# Patient Record
Sex: FEMALE | Race: BLACK OR AFRICAN AMERICAN | Employment: FULL TIME | URBAN - METROPOLITAN AREA
[De-identification: names, ages, dates, MRNs, and addresses within clinical notes are randomized per-mention and may not be internally consistent; named-entity substitution may affect disease eponyms.]

---

## 2019-03-07 ENCOUNTER — HOSPITAL ENCOUNTER (EMERGENCY)
Age: 24
Discharge: HOME | End: 2019-03-07
Attending: EMERGENCY MEDICINE
Payer: COMMERCIAL

## 2019-03-07 VITALS
WEIGHT: 284.25 LBS | TEMPERATURE: 98.1 F | HEART RATE: 71 BPM | HEIGHT: 66 IN | RESPIRATION RATE: 18 BRPM | BODY MASS INDEX: 45.68 KG/M2 | SYSTOLIC BLOOD PRESSURE: 136 MMHG | DIASTOLIC BLOOD PRESSURE: 61 MMHG | OXYGEN SATURATION: 100 %

## 2019-03-07 DIAGNOSIS — R11.0 NAUSEA: ICD-10-CM

## 2019-03-07 DIAGNOSIS — R51.9 NONINTRACTABLE HEADACHE, UNSPECIFIED CHRONICITY PATTERN, UNSPECIFIED HEADACHE TYPE: Primary | ICD-10-CM

## 2019-03-07 LAB
BACTERIA: ABNORMAL /HPF
BILIRUBIN URINE: NEGATIVE
BLOOD, URINE: NEGATIVE
CLARITY: CLEAR
COLOR: YELLOW
EPITHELIAL CELLS, UA: ABNORMAL /HPF
GLUCOSE URINE: NEGATIVE MG/DL
HCG(URINE) PREGNANCY TEST: NEGATIVE
KETONES, URINE: NEGATIVE MG/DL
LEUKOCYTE ESTERASE, URINE: ABNORMAL
MICROSCOPIC EXAMINATION: YES
NITRITE, URINE: NEGATIVE
PH UA: 6 (ref 5–8)
PROTEIN UA: NEGATIVE MG/DL
RBC UA: ABNORMAL /HPF (ref 0–2)
SPECIFIC GRAVITY UA: 1.02 (ref 1–1.03)
URINE TYPE: ABNORMAL
UROBILINOGEN, URINE: 0.2 E.U./DL
WBC UA: ABNORMAL /HPF (ref 0–5)

## 2019-03-07 PROCEDURE — 81001 URINALYSIS AUTO W/SCOPE: CPT

## 2019-03-07 PROCEDURE — 84703 CHORIONIC GONADOTROPIN ASSAY: CPT

## 2019-03-07 PROCEDURE — 99281 EMR DPT VST MAYX REQ PHY/QHP: CPT

## 2019-03-07 PROCEDURE — 99282 EMERGENCY DEPT VISIT SF MDM: CPT

## 2019-03-07 RX ORDER — BUTALBITAL, ACETAMINOPHEN AND CAFFEINE 50; 325; 40 MG/1; MG/1; MG/1
1 TABLET ORAL EVERY 4 HOURS PRN
Qty: 20 TABLET | Refills: 1 | Status: SHIPPED | OUTPATIENT
Start: 2019-03-07 | End: 2021-12-18

## 2019-03-07 RX ORDER — ONDANSETRON 4 MG/1
4 TABLET, ORALLY DISINTEGRATING ORAL EVERY 8 HOURS PRN
Qty: 12 TABLET | Refills: 1 | Status: SHIPPED | OUTPATIENT
Start: 2019-03-07 | End: 2019-05-05

## 2019-03-07 NOTE — ED NOTES
Patient prepared for and ready to be discharged. Patient discharged at this time in no acute distress after verbalizing understanding of discharge instructions. Patient left after receiving After Visit Summary instructions.         Piero Schmitt RN  03/07/19 0523

## 2019-03-07 NOTE — ED PROVIDER NOTES
and prognosis. The plan is to discharge to home. The patient is in agreement with the plan and questions have been answered. I also discussed with the patient and/or family the reasons which may require a return visit and the importance of follow-up care.        (Please note that portions of this note may have been completed with a voice recognition program.  Efforts were made to edit the dictation but occasionally words are mis-transcribed)        FINAL IMPRESSION:  1 -- headache  2 -- nausea                  Rob Olivera MD  03/07/19 2833

## 2019-05-05 ENCOUNTER — HOSPITAL ENCOUNTER (EMERGENCY)
Age: 24
Discharge: HOME | End: 2019-05-05
Attending: EMERGENCY MEDICINE
Payer: COMMERCIAL

## 2019-05-05 VITALS
HEIGHT: 66 IN | RESPIRATION RATE: 20 BRPM | HEART RATE: 88 BPM | TEMPERATURE: 97.9 F | SYSTOLIC BLOOD PRESSURE: 128 MMHG | DIASTOLIC BLOOD PRESSURE: 58 MMHG | WEIGHT: 286.8 LBS | BODY MASS INDEX: 46.09 KG/M2 | OXYGEN SATURATION: 100 %

## 2019-05-05 DIAGNOSIS — R11.2 NON-INTRACTABLE VOMITING WITH NAUSEA, UNSPECIFIED VOMITING TYPE: Primary | ICD-10-CM

## 2019-05-05 DIAGNOSIS — Z32.02 NEGATIVE PREGNANCY TEST: ICD-10-CM

## 2019-05-05 LAB
BILIRUBIN URINE: NEGATIVE
BLOOD, URINE: NEGATIVE
CLARITY: ABNORMAL
COLOR: YELLOW
GLUCOSE URINE: NEGATIVE MG/DL
HCG(URINE) PREGNANCY TEST: NEGATIVE
KETONES, URINE: NEGATIVE MG/DL
LEUKOCYTE ESTERASE, URINE: NEGATIVE
MICROSCOPIC EXAMINATION: ABNORMAL
NITRITE, URINE: NEGATIVE
PH UA: 6 (ref 5–8)
PROTEIN UA: NEGATIVE MG/DL
SPECIFIC GRAVITY UA: 1.02 (ref 1–1.03)
URINE TYPE: ABNORMAL
UROBILINOGEN, URINE: 1 E.U./DL

## 2019-05-05 PROCEDURE — 99282 EMERGENCY DEPT VISIT SF MDM: CPT

## 2019-05-05 PROCEDURE — 81003 URINALYSIS AUTO W/O SCOPE: CPT

## 2019-05-05 PROCEDURE — 99281 EMR DPT VST MAYX REQ PHY/QHP: CPT

## 2019-05-05 PROCEDURE — 84703 CHORIONIC GONADOTROPIN ASSAY: CPT

## 2019-05-05 RX ORDER — ONDANSETRON 4 MG/1
4 TABLET, ORALLY DISINTEGRATING ORAL EVERY 8 HOURS PRN
Qty: 12 TABLET | Refills: 1 | Status: SHIPPED | OUTPATIENT
Start: 2019-05-05 | End: 2019-09-06 | Stop reason: ALTCHOICE

## 2019-05-05 NOTE — ED PROVIDER NOTES
doctor. She was given a prescription for Zofran to take if needed for nausea/vomiting. I discussed with Edmondnae Moore the results of evaluation in the Emergency Department, diagnosis, care and prognosis. The plan is to discharge to home. The patient is in agreement with the plan and questions have been answered. I also discussed with the patient and/or family the reasons which may require a return visit and the importance of follow-up care.        (Please note that portions of this note may have been completed with a voice recognition program.  Efforts were made to edit the dictation but occasionally words are mis-transcribed)        FINAL IMPRESSION:  1 -- nausea with vomiting  2 -- negative pregnancy test                    Analia Healy MD  05/05/19 8311

## 2019-09-06 ENCOUNTER — HOSPITAL ENCOUNTER (EMERGENCY)
Age: 24
Discharge: HOME | End: 2019-09-06
Attending: EMERGENCY MEDICINE
Payer: COMMERCIAL

## 2019-09-06 VITALS
HEART RATE: 87 BPM | BODY MASS INDEX: 45.67 KG/M2 | HEIGHT: 66 IN | WEIGHT: 284.2 LBS | TEMPERATURE: 98.1 F | RESPIRATION RATE: 14 BRPM | SYSTOLIC BLOOD PRESSURE: 142 MMHG | OXYGEN SATURATION: 100 % | DIASTOLIC BLOOD PRESSURE: 85 MMHG

## 2019-09-06 DIAGNOSIS — R03.0 ELEVATED BLOOD PRESSURE READING: ICD-10-CM

## 2019-09-06 DIAGNOSIS — Z32.02 ENCOUNTER FOR PREGNANCY TEST, RESULT NEGATIVE: ICD-10-CM

## 2019-09-06 DIAGNOSIS — R19.8 ABDOMINAL SYMPTOMS: Primary | ICD-10-CM

## 2019-09-06 LAB
BACTERIA: ABNORMAL /HPF
BILIRUBIN URINE: NEGATIVE
BLOOD, URINE: NEGATIVE
CLARITY: CLEAR
COLOR: YELLOW
EPITHELIAL CELLS, UA: ABNORMAL /HPF
GLUCOSE URINE: NEGATIVE MG/DL
HCG QUALITATIVE: NEGATIVE
KETONES, URINE: NEGATIVE MG/DL
LEUKOCYTE ESTERASE, URINE: ABNORMAL
MICROSCOPIC EXAMINATION: YES
MUCUS: ABNORMAL /LPF
NITRITE, URINE: NEGATIVE
PH UA: 6 (ref 5–8)
PROTEIN UA: ABNORMAL MG/DL
RBC UA: ABNORMAL /HPF (ref 0–2)
SPECIFIC GRAVITY UA: >=1.03 (ref 1–1.03)
URINE REFLEX TO CULTURE: YES
URINE TYPE: ABNORMAL
UROBILINOGEN, URINE: 0.2 E.U./DL
WBC UA: ABNORMAL /HPF (ref 0–5)

## 2019-09-06 PROCEDURE — 84703 CHORIONIC GONADOTROPIN ASSAY: CPT

## 2019-09-06 PROCEDURE — 87086 URINE CULTURE/COLONY COUNT: CPT

## 2019-09-06 PROCEDURE — 81001 URINALYSIS AUTO W/SCOPE: CPT

## 2019-09-06 PROCEDURE — 99282 EMERGENCY DEPT VISIT SF MDM: CPT

## 2019-09-07 NOTE — ED PROVIDER NOTES
reviewed. ED Triage Vitals [09/06/19 2018]   Enc Vitals Group      BP (!) 142/85      Pulse 87      Resp 14      Temp 98.1 °F (36.7 °C)      Temp Source Oral      SpO2 100 %      Weight 284 lb 3.2 oz (128.9 kg)      Height 5' 6\" (1.676 m)      Head Circumference       Peak Flow       Pain Score       Pain Loc       Pain Edu? Excl. in [address]? GENERAL:  Obese, Awake, alert. Well developed, well nourished with no apparent distress. HENT:  Normocephalic, Atraumatic, no lacerations. EYES:  Conjunctiva normal, Pupils equal round and reactive to light, extraocular movements normal.  NECK:  Trachea is midline. No stridor. CHEST:  Regular rate and regular rhythm, no murmurs/rubs/gallops, normal S1/S2, chest wall non-tender. LUNGS:  No respiratory distress. No abdominal retractions, no sternal retractions. Clear to auscultation bilaterally, no wheezing, no rhochi, no rales  ABDOMEN:  Soft, mild epigastric tenderness to palpation, non-distended. No guarding and no rebound. No costovertebral angle tenderness to palpation. Normal BS, no organomegaly, no abdominal masses  EXTREMITIES:  Normal range of motion, no edema, no tenderness, no deformity, distal pulses present and equal bilaterally. Moves extremities x4 with purpose. SKIN: Warm, dry and intact. NEUROLOGIC: Normal mental status. Moving all extremities to command. Alert and oriented x4 without focal motor deficit or gross sensory deficit. Normal speech. PSYCHIATRIC: Not anxious, normal mood and affect, thoughts are linear and organized, without delusions/hallucinations, responds appropriately to questions      LABS and DIAGNOSTIC RESULTS    RADIOLOGY  X-RAYS:  I have reviewed radiologic plain film image(s). ALL OTHER NON-PLAIN FILM IMAGES SUCH AS CT, ULTRASOUND AND MRI HAVE BEEN READ BY THE RADIOLOGIST.   No orders to display            LABS  Results for orders placed or performed during the hospital encounter of 09/06/19   HCG

## 2019-09-08 LAB — URINE CULTURE, ROUTINE: NORMAL

## 2021-12-18 ENCOUNTER — HOSPITAL ENCOUNTER (EMERGENCY)
Age: 26
Discharge: HOME | End: 2021-12-18
Attending: EMERGENCY MEDICINE
Payer: MEDICARE

## 2021-12-18 VITALS
OXYGEN SATURATION: 100 % | HEART RATE: 94 BPM | RESPIRATION RATE: 16 BRPM | WEIGHT: 293 LBS | TEMPERATURE: 97.5 F | HEIGHT: 66 IN | BODY MASS INDEX: 47.09 KG/M2 | DIASTOLIC BLOOD PRESSURE: 80 MMHG | SYSTOLIC BLOOD PRESSURE: 132 MMHG

## 2021-12-18 DIAGNOSIS — F41.0 PANIC ATTACK: Primary | ICD-10-CM

## 2021-12-18 DIAGNOSIS — R07.89 NON-CARDIAC CHEST PAIN: ICD-10-CM

## 2021-12-18 PROCEDURE — 99284 EMERGENCY DEPT VISIT MOD MDM: CPT

## 2021-12-18 PROCEDURE — 93005 ELECTROCARDIOGRAM TRACING: CPT | Performed by: EMERGENCY MEDICINE

## 2021-12-18 RX ORDER — HYDROXYZINE PAMOATE 25 MG/1
25-50 CAPSULE ORAL 3 TIMES DAILY PRN
Qty: 30 CAPSULE | Refills: 0 | Status: SHIPPED | OUTPATIENT
Start: 2021-12-18 | End: 2022-01-01

## 2021-12-18 NOTE — ED PROVIDER NOTES
TRIAGE CHIEF COMPLAINT:   Chief Complaint   Patient presents with    Panic Attack       HPI: Eliud Boogie is a 32 y.o. female who presents to the emergency department with complaint of having a panic attack today. Patient states she has been under increased stress recently. She states today she was scratching her arms when she felt some tightness in her chest as she hyperextended her arms. She then felt some transient tingling in her left arm. She has no chest discomfort or left arm symptoms now. No cough or URI symptoms. No shortness of breath. Denies fever or chills. No abdominal pain vomiting or bowel complaint. No urinary symptoms. Patient states she has a history of panic attacks but has never been treated with medication for this. REVIEW OF SYSTEMS:   10 systems reviewed. Pertinent positives per HPI. Otherwise noted to be negative. I have reviewed the triage/nursing documentation and agree unless otherwise noted below. PAST MEDICAL HISTORY:   Past Medical History:   Diagnosis Date    Hypertension     TIA (transient ischemic attack)         CURRENT MEDICATIONS:   Patient's Medications   New Prescriptions    No medications on file   Previous Medications    No medications on file   Modified Medications    No medications on file   Discontinued Medications    BUTALBITAL-ACETAMINOPHEN-CAFFEINE (FIORICET, ESGIC) -40 MG PER TABLET    Take 1 tablet by mouth every 4 hours as needed for Headaches        SURGICAL HISTORY:   History reviewed. No pertinent surgical history. FAMILY HISTORY:   History reviewed. No pertinent family history. SOCIAL HISTORY:    reports that she has quit smoking. She has never used smokeless tobacco. She reports that she does not drink alcohol and does not use drugs.     ALLERGIES:   Allergies   Allergen Reactions    Dilaudid [Hydromorphone Hcl] Anaphylaxis       PHYSICAL EXAM:  VITAL SIGNS: /80   Pulse 94   Temp 97.5 °F (36.4 °C) (Oral)   Resp 16 Ht 5' 6\" (1.676 m)   Wt (!) 341 lb 14.4 oz (155.1 kg)   SpO2 100%   BMI 55.18 kg/m²   Constitutional:  No acute distress, Non-toxic appearance. Not pale or diaphoretic. No respiratory distress. HENT: Normocephalic, Atraumatic Oropharynx moist, No oral exudates. TMs are normal.  Eyes:  PERRL, EOMI, Conjunctiva normal, No discharge. Neck: No tenderness, Supple, No lymphadenopathy, No stridor. Cardiovascular:  Normal heart rate, Normal rhythm, No murmurs, No rubs, No gallops. Pulmonary/Chest:  Normal breath sounds, No respiratory distress, No wheezing,  Abdomen:   Soft, No tenderness, No masses, No pulsatile masses  Back:  No tenderness, No CVA tenderness  Extremities:  Normal range of motion, Intact distal pulses, No edema, No tenderness  Neurologic:  Alert & oriented x 3, Speech is clear and appropriate, No upper extremity drift or lower extremity weakness,  Normal sensory function, No facial asymmetry, no truncal or extremity ataxia. Normal gait. Skin:  Warm, Dry, No erythema, No rash  Psychiatric:  Affect normal, Mood normal      EKG:    EKG interpreted by myself. Sinus tachycardia at a rate of 103. Axis is 57. There is no ischemia. Intervals are normal. QTC is 427. Radiology:      LAB      ED COURSE & MEDICAL DECISION MAKING:  Pertinent Labs & Imaging studies reviewed. (See chart for details)  51-year-old female who states she is under increased stress, history of previous panic attacks, states he has felt somewhat anxious today and after she stretched her arm she felt some transient tightness in her chest and tingling in her left arm. Those symptoms resolved quickly and she has no chest discomfort or shortness of breath now. No numbness or weakness. No tingling. She states she became concerned and felt like she was having a panic attack but is feeling better now. EKG shows no ischemia or arrhythmia. She was reassured. I recommended follow-up with her primary care provider at [facility].   She was given a prescription for Vistaril to take if needed for anxiety symptoms. I see no clinical evidence for ACS, pneumothorax, dissection or DVT/PE. I discussed with Mavis Nobles the results of the evaluation in the Emergency Department, diagnosis, care, prognosis and the importance of follow-up. The patient is stable for discharge. The patient and/or family are in agreement with the plan and all questions have been answered. Specific discharge instructions were explained, including reasons to return to the emergency department.               (Please note that portions of this note may have been completed with a voice recognition program.  Efforts were made to edit the dictation but occasionally words are mis-transcribed)      FINAL IMPRESSION:  1 --panic attack  2 --noncardiac chest pain                Ozzy Caba MD  12/20/21 8941

## 2021-12-19 LAB
EKG ATRIAL RATE: 103 BPM
EKG DIAGNOSIS: NORMAL
EKG P AXIS: 51 DEGREES
EKG P-R INTERVAL: 186 MS
EKG Q-T INTERVAL: 326 MS
EKG QRS DURATION: 76 MS
EKG QTC CALCULATION (BAZETT): 427 MS
EKG R AXIS: 57 DEGREES
EKG T AXIS: 16 DEGREES
EKG VENTRICULAR RATE: 103 BPM

## 2021-12-19 PROCEDURE — 93010 ELECTROCARDIOGRAM REPORT: CPT | Performed by: INTERNAL MEDICINE

## 2023-01-22 ENCOUNTER — APPOINTMENT (OUTPATIENT)
Dept: GENERAL RADIOLOGY | Age: 28
End: 2023-01-22
Payer: COMMERCIAL

## 2023-01-22 ENCOUNTER — HOSPITAL ENCOUNTER (EMERGENCY)
Age: 28
Discharge: HOME | End: 2023-01-22
Attending: EMERGENCY MEDICINE
Payer: COMMERCIAL

## 2023-01-22 VITALS
SYSTOLIC BLOOD PRESSURE: 136 MMHG | WEIGHT: 293 LBS | HEART RATE: 79 BPM | DIASTOLIC BLOOD PRESSURE: 77 MMHG | OXYGEN SATURATION: 100 % | RESPIRATION RATE: 21 BRPM | BODY MASS INDEX: 55.2 KG/M2

## 2023-01-22 DIAGNOSIS — I47.1 PAROXYSMAL SUPRAVENTRICULAR TACHYCARDIA (HCC): Primary | ICD-10-CM

## 2023-01-22 LAB
ANION GAP SERPL CALCULATED.3IONS-SCNC: 9 MMOL/L (ref 3–16)
BASOPHILS ABSOLUTE: 0.1 K/UL (ref 0–0.2)
BASOPHILS RELATIVE PERCENT: 1 %
BUN BLDV-MCNC: 12 MG/DL (ref 7–20)
CALCIUM SERPL-MCNC: 9.1 MG/DL (ref 8.3–10.6)
CHLORIDE BLD-SCNC: 104 MMOL/L (ref 99–110)
CO2: 24 MMOL/L (ref 21–32)
CREAT SERPL-MCNC: 0.8 MG/DL (ref 0.6–1.1)
EKG ATRIAL RATE: 103 BPM
EKG DIAGNOSIS: NORMAL
EKG P AXIS: 41 DEGREES
EKG P-R INTERVAL: 178 MS
EKG Q-T INTERVAL: 336 MS
EKG QRS DURATION: 76 MS
EKG QTC CALCULATION (BAZETT): 440 MS
EKG R AXIS: 44 DEGREES
EKG T AXIS: 9 DEGREES
EKG VENTRICULAR RATE: 103 BPM
EOSINOPHILS ABSOLUTE: 0.1 K/UL (ref 0–0.6)
EOSINOPHILS RELATIVE PERCENT: 1.4 %
GFR SERPL CREATININE-BSD FRML MDRD: >60 ML/MIN/{1.73_M2}
GLUCOSE BLD-MCNC: 115 MG/DL (ref 70–99)
HCG(URINE) PREGNANCY TEST: NEGATIVE
HCT VFR BLD CALC: 43.4 % (ref 36–48)
HEMOGLOBIN: 14.2 G/DL (ref 12–16)
LYMPHOCYTES ABSOLUTE: 2.2 K/UL (ref 1–5.1)
LYMPHOCYTES RELATIVE PERCENT: 31.6 %
MCH RBC QN AUTO: 26 PG (ref 26–34)
MCHC RBC AUTO-ENTMCNC: 32.6 G/DL (ref 31–36)
MCV RBC AUTO: 79.8 FL (ref 80–100)
MONOCYTES ABSOLUTE: 0.8 K/UL (ref 0–1.3)
MONOCYTES RELATIVE PERCENT: 12.2 %
NEUTROPHILS ABSOLUTE: 3.7 K/UL (ref 1.7–7.7)
NEUTROPHILS RELATIVE PERCENT: 53.8 %
PDW BLD-RTO: 14.4 % (ref 12.4–15.4)
PLATELET # BLD: 406 K/UL (ref 135–450)
PMV BLD AUTO: 8.5 FL (ref 5–10.5)
POTASSIUM REFLEX MAGNESIUM: 8.4 MMOL/L (ref 3.5–5.1)
POTASSIUM SERPL-SCNC: 4.3 MMOL/L (ref 3.5–5.1)
RBC # BLD: 5.44 M/UL (ref 4–5.2)
SODIUM BLD-SCNC: 137 MMOL/L (ref 136–145)
TROPONIN: <0.01 NG/ML
WBC # BLD: 6.9 K/UL (ref 4–11)

## 2023-01-22 PROCEDURE — 99285 EMERGENCY DEPT VISIT HI MDM: CPT

## 2023-01-22 PROCEDURE — 80048 BASIC METABOLIC PNL TOTAL CA: CPT

## 2023-01-22 PROCEDURE — 84132 ASSAY OF SERUM POTASSIUM: CPT

## 2023-01-22 PROCEDURE — 93005 ELECTROCARDIOGRAM TRACING: CPT | Performed by: EMERGENCY MEDICINE

## 2023-01-22 PROCEDURE — 6370000000 HC RX 637 (ALT 250 FOR IP): Performed by: EMERGENCY MEDICINE

## 2023-01-22 PROCEDURE — 84703 CHORIONIC GONADOTROPIN ASSAY: CPT

## 2023-01-22 PROCEDURE — 84484 ASSAY OF TROPONIN QUANT: CPT

## 2023-01-22 PROCEDURE — 71046 X-RAY EXAM CHEST 2 VIEWS: CPT

## 2023-01-22 PROCEDURE — 85025 COMPLETE CBC W/AUTO DIFF WBC: CPT

## 2023-01-22 PROCEDURE — 36415 COLL VENOUS BLD VENIPUNCTURE: CPT

## 2023-01-22 RX ADMIN — METOPROLOL TARTRATE 25 MG: 25 TABLET, FILM COATED ORAL at 07:48

## 2023-01-22 ASSESSMENT — ENCOUNTER SYMPTOMS
VOMITING: 0
NAUSEA: 0
FACIAL SWELLING: 0
EYE PAIN: 0
DIARRHEA: 0
SHORTNESS OF BREATH: 0
COUGH: 0
SORE THROAT: 0
EYE DISCHARGE: 0
CHEST TIGHTNESS: 0
ABDOMINAL PAIN: 0
WHEEZING: 0
PHOTOPHOBIA: 0
EYE ITCHING: 0
BACK PAIN: 0
RHINORRHEA: 0

## 2023-01-22 ASSESSMENT — PAIN - FUNCTIONAL ASSESSMENT
PAIN_FUNCTIONAL_ASSESSMENT: NONE - DENIES PAIN
PAIN_FUNCTIONAL_ASSESSMENT: NONE - DENIES PAIN

## 2023-01-22 NOTE — Clinical Note
Arely Villarreal was seen and treated in our emergency department on 1/22/2023. She may return to work on . If you have any questions or concerns, please don't hesitate to call.       Destin Archer MD

## 2023-01-22 NOTE — ED PROVIDER NOTES
[address] [place]          ATTENDING PHYSICIAN NOTE       Date of evaluation: 1/22/2023    Chief Complaint     Chest Pain (Pt to ED with complaint of some chest pain that started this morning. Per EMS, pt had HR in 200's. EMS instructed patient to perform vagal maneuvers and HR came down to 100's. Per EMS, looked like SVT. )      History of Present Illness     Paras Weiner is a 32 y.o. female who presents with a chief complaint of chest pain. Patient states she woke up this morning and started to go to work. When she got to work she started to notice that she was feeling anxious and having palpitations and chest pain. She called EMS and they found her heart rate to be in the 200s. She was noted to be in SVT. With a vagal maneuver her heart rate converted to a sinus rhythm in the low 100s. I was able to review the EKG obtained during SVT and agree it looks like SVT. Patient states she now feels completely better. Has no chest pain. Patient does have a history of noted SVT, was admitted at 1 point when her troponin went up after a prolonged episode of SVT. Was cleared by cardiology for outpatient stress testing at that time. Elevated troponin thought to be due to SVT at that time. Patient with no history of heart attack, does states she has a history of stroke but when I look in her chart it looks like she has a history of Bell's palsy in 2015. Patient states she used to be on metoprolol but she was unable to follow-up with a doctor and she ran out of metoprolol and is no longer on her metoprolol. She thinks this used to help with what she thought was panic attacks but doctors tell her was actually her palpitations and SVT. Review of Systems     Review of Systems   Constitutional:  Negative for activity change, appetite change, chills and fever. HENT:  Negative for congestion, ear pain, facial swelling, nosebleeds, rhinorrhea and sore throat.     Eyes: Negative for photophobia, pain, discharge, itching and visual disturbance. Respiratory:  Negative for cough, chest tightness, shortness of breath and wheezing. Cardiovascular:  Positive for chest pain and palpitations. Negative for leg swelling. Gastrointestinal:  Negative for abdominal pain, diarrhea, nausea and vomiting. Endocrine: Negative for cold intolerance, heat intolerance, polydipsia and polyuria. Genitourinary:  Negative for dysuria, flank pain, hematuria, pelvic pain, vaginal bleeding and vaginal discharge. Musculoskeletal:  Negative for arthralgias, back pain, joint swelling, myalgias and neck pain. Skin:  Negative for rash and wound. Allergic/Immunologic: Negative for environmental allergies. Neurological:  Negative for dizziness, tremors, seizures, syncope, weakness, light-headedness and headaches. Hematological:  Negative for adenopathy. Does not bruise/bleed easily. Psychiatric/Behavioral:  Negative for confusion and hallucinations. Past Medical, Surgical, Family, and Social History     She has a past medical history of Hypertension and TIA (transient ischemic attack). She has no past surgical history on file. Her family history is not on file. She reports that she has quit smoking. She has never used smokeless tobacco. She reports that she does not drink alcohol and does not use drugs. Medications     Previous Medications    No medications on file       Allergies     She is allergic to dilaudid [hydromorphone hcl]. Physical Exam     INITIAL VITALS: BP: (!) 137/101,  , Heart Rate: (!) 105, Resp: 19, SpO2: 100 %   Physical Exam  Constitutional:       General: She is not in acute distress. Appearance: She is well-developed. She is not diaphoretic. HENT:      Head: Normocephalic and atraumatic. Mouth/Throat:      Pharynx: No oropharyngeal exudate. Eyes:      General:         Right eye: No discharge. Left eye: No discharge. Conjunctiva/sclera: Conjunctivae normal.      Pupils: Pupils are equal, round, and reactive to light. Neck:      Thyroid: No thyromegaly. Vascular: No JVD. Trachea: No tracheal deviation. Cardiovascular:      Rate and Rhythm: Regular rhythm. Tachycardia present. Heart sounds: Normal heart sounds. No murmur heard. No friction rub. No gallop. Comments: Tachycardic to 103  Pulmonary:      Effort: Pulmonary effort is normal. No respiratory distress. Breath sounds: Normal breath sounds. No stridor. No wheezing or rales. Chest:      Chest wall: No tenderness. Abdominal:      General: Bowel sounds are normal. There is no distension. Palpations: Abdomen is soft. Tenderness: There is no abdominal tenderness. There is no guarding or rebound. Musculoskeletal:         General: No tenderness or deformity. Normal range of motion. Cervical back: Normal range of motion and neck supple. Lymphadenopathy:      Cervical: No cervical adenopathy. Skin:     General: Skin is warm and dry. Capillary Refill: Capillary refill takes less than 2 seconds. Findings: No erythema or rash. Neurological:      General: No focal deficit present. Mental Status: She is alert and oriented to person, place, and time. Cranial Nerves: No cranial nerve deficit. Coordination: Coordination normal.   Psychiatric:         Behavior: Behavior normal.       Diagnostic Results     EKG   Indication: Chest pain, SVT and ambulance. Heart rate 103, intervals normal, axis normal.  No findings of ST elevation or depression, isolated T wave inversion in lead III which is unchanged from prior EKG. Impression: Sinus tachycardia with no active ischemia. RADIOLOGY:  XR CHEST (2 VW)   Final Result   1. No findings for acute cardiopulmonary disease.           LABS:   Results for orders placed or performed during the hospital encounter of 01/22/23   BMP w/ Reflex to MG   Result Value Ref Range    Sodium 137 136 - 145 mmol/L    Potassium reflex Magnesium 8.4 (HH) 3.5 - 5.1 mmol/L    Chloride 104 99 - 110 mmol/L    CO2 24 21 - 32 mmol/L    Anion Gap 9 3 - 16    Glucose 115 (H) 70 - 99 mg/dL    BUN 12 7 - 20 mg/dL    Creatinine 0.8 0.6 - 1.1 mg/dL    Est, Glom Filt Rate >60 >60    Calcium 9.1 8.3 - 10.6 mg/dL   CBC with Auto Differential   Result Value Ref Range    WBC 6.9 4.0 - 11.0 K/uL    RBC 5.44 (H) 4.00 - 5.20 M/uL    Hemoglobin 14.2 12.0 - 16.0 g/dL    Hematocrit 43.4 36.0 - 48.0 %    MCV 79.8 (L) 80.0 - 100.0 fL    MCH 26.0 26.0 - 34.0 pg    MCHC 32.6 31.0 - 36.0 g/dL    RDW 14.4 12.4 - 15.4 %    Platelets 406 135 - 450 K/uL    MPV 8.5 5.0 - 10.5 fL    Neutrophils % 53.8 %    Lymphocytes % 31.6 %    Monocytes % 12.2 %    Eosinophils % 1.4 %    Basophils % 1.0 %    Neutrophils Absolute 3.7 1.7 - 7.7 K/uL    Lymphocytes Absolute 2.2 1.0 - 5.1 K/uL    Monocytes Absolute 0.8 0.0 - 1.3 K/uL    Eosinophils Absolute 0.1 0.0 - 0.6 K/uL    Basophils Absolute 0.1 0.0 - 0.2 K/uL   Troponin   Result Value Ref Range    Troponin <0.01 <0.01 ng/mL   Pregnancy, urine   Result Value Ref Range    HCG(Urine) Pregnancy Test Negative Detects HCG level >20 MIU/mL   Potassium   Result Value Ref Range    Potassium 4.3 3.5 - 5.1 mmol/L   EKG 12 Lead   Result Value Ref Range    Ventricular Rate 103 BPM    Atrial Rate 103 BPM    P-R Interval 178 ms    QRS Duration 76 ms    Q-T Interval 336 ms    QTc Calculation (Bazett) 440 ms    P Axis 41 degrees    R Axis 44 degrees    T Axis 9 degrees    Diagnosis       EKG performed in ER and to be interpreted by ER physician.Confirmed by ELISE GRIFFITH ([id number]),  CADEN SHERAMN ([id number]) on 1/22/2023 11:05:23 AM       ED BEDSIDE ULTRASOUND:  No results found.    RECENT VITALS:  BP: (!) 137/101, , Heart Rate: (!) 105, Resp: 19, SpO2: 100 %     Procedures     none    ED Course     Nursing Notes, Past Medical Hx, Past Surgical Hx, Social Hx,Allergies, and Family Hx were reviewed.         patient was  given the following medications:  Orders Placed This Encounter   Medications    metoprolol tartrate (LOPRESSOR) tablet 25 mg    metoprolol tartrate (LOPRESSOR) 25 MG tablet     Sig: Take 1 tablet by mouth 2 times daily     Dispense:  180 tablet     Refill:  4       CONSULTS:  None    MEDICAL DECISIONMAKING / ASSESSMENT / PLAN     CHINGStephen Javier is a 27 y.o. female who presents with a chief complaint of chest pain.  Initial exam reveals a well-appearing female in no acute distress with mild tachycardia to 103 but otherwise normal vitals, afebrile.    Physical exam remarkable for normal exam.    I was able to review the rhythm strip presented to me by EMS, it does appear that the patient was in SVT with a heart rate around 200.  On chart review it does appear she has a history of this based on outside hospital records.  As above in HPI she has been admitted before for elevated troponins.    Work-up here revealed no acute findings including negative troponin, initially hemolyzed renal panel but repeat potassium at 4 which is normal.  No other electrolyte abnormalities.  Chest x-ray unremarkable.  EKG nonischemic.    Patient monitored for several hours with no further findings of SVT.  On several reassessments patient was in normal sinus rhythm with heart rates in the 70s and 80s.  Feeling better.    I am going to start the patient back on her metoprolol.  She states she used to be on 50 but she did not like that dose and it was eventually decreased to 25 twice daily.  I gave the patient a dose here in the emergency department.    Patient will be discharged with follow-up with cardiology.  Referral given.    Medical Decision Making  Problems Addressed:  Paroxysmal supraventricular tachycardia (HCC): acute illness or injury    Amount and/or Complexity of Data Reviewed  Independent Historian: EMS  Labs: ordered. Decision-making details documented in ED Course.  Radiology: ordered. Decision-making details documented in  ED Course. ECG/medicine tests: ordered and independent interpretation performed. Decision-making details documented in ED Course. Risk  Prescription drug management. Clinical Impression     1.  Paroxysmal supraventricular tachycardia [facility])        Disposition     PATIENT REFERRED TO:  Bryan Ruby [address]  [phone]    In 1 week  for ED follow up visit - call to set up an appointment for cardiology    [facility]  [address]  [phone]    In 1 week  for ED follow up visit -- call to set up an appointment for a primary care doctor    DISCHARGE MEDICATIONS:  New Prescriptions    METOPROLOL TARTRATE (LOPRESSOR) 25 MG TABLET    Take 1 tablet by mouth 2 times daily       DISPOSITION Decision To Discharge 01/22/2023 07:49:04 AM         Baljeet He MD  01/22/23 1141

## 2023-01-22 NOTE — Clinical Note
Wolf Comer was seen and treated in our emergency department on 1/22/2023. She may return to work on 01/23/2023. If you have any questions or concerns, please don't hesitate to call.       Lianne Flannery MD

## 2023-01-22 NOTE — DISCHARGE INSTRUCTIONS
Follow-up with cardiology for SVT. They can continue to prescribe your metoprolol. I have given you several refills. You should take this once in the morning and once at night every day.

## 2023-02-03 NOTE — PROGRESS NOTES
[facility]     Outpatient Cardiology         Patient Name:  Jan Bates  Requesting Physician: Blanca Leonard MD  Primary Care Physician: [address]    Reason for Consultation/Chief Complaint:   No chief complaint on file. HPI:     32year old female with history of HTN, TIA, referred my [facility] ED MD for chest pain and SVT. Patient was seen in [facility] ED 1/22/23 with complaints of chest pain, palpitations, and SVT with HR in 200s upon EMS arrival. Patient was able to perform vagal maneuver while in EMS care and converted to NSR with HR in 100s. Further work up in ED revealed no acute findings and patient was released later that day. Histories:     Past Medical History:   has a past medical history of Hypertension and TIA (transient ischemic attack). Surgical History:   has no past surgical history on file. Social History:   reports that she has quit smoking. She has never used smokeless tobacco. She reports that she does not drink alcohol and does not use drugs. Family History:  No evidence for sudden cardiac death or premature CAD    Medications:     Home Medications:  Were reviewed and are listed in nursing record. and/or listed below    Prior to Admission medications    Medication Sig Start Date End Date Taking? Authorizing Provider   metoprolol tartrate (LOPRESSOR) 25 MG tablet Take 1 tablet by mouth 2 times daily 1/22/23   Blanca Leonard MD        Allergy:     Dilaudid [hydromorphone hcl]     Review of Systems:     Review of Systems    Physical Examination:     There were no vitals filed for this visit.     Wt Readings from Last 3 Encounters:   01/22/23 (!) 342 lb (155.1 kg)   12/18/21 (!) 341 lb 14.4 oz (155.1 kg)   09/06/19 284 lb 3.2 oz (128.9 kg)       Physical Exam      Labs:     Lab Results   Component Value Date    WBC 6.9 01/22/2023    HGB 14.2 01/22/2023    HCT 43.4 01/22/2023    MCV 79.8 (L) 01/22/2023     01/22/2023     Lab Results Component Value Date     01/22/2023    K 4.3 01/22/2023     01/22/2023    CO2 24 01/22/2023    BUN 12 01/22/2023    CREATININE 0.8 01/22/2023    GLUCOSE 115 (H) 01/22/2023    CALCIUM 9.1 01/22/2023    LABGLOM >60 01/22/2023    GFRAA >60 11/09/2016         No results found for: CHOL  No results found for: TRIG  No results found for: HDL  No results found for: LDLCHOLESTEROL, LDLCALC  No results found for: LABVLDL, VLDL  No results found for: CHOLHDLRATIO    No results found for: INR, PROTIME    The ASCVD Risk score (Chun DK, et al., 2019) failed to calculate for the following reasons: The 2019 ASCVD risk score is only valid for ages 36 to 78      Imaging:       ECG (if available, Personally interpreted):    Last Monitor/Holter (if available):    Last Stress (if available):    Last Cath (if available):    Last TTE/ISRAEL(if available):    Last CMR  (if available):    Last Coronary Artery Calcium Score: Ankle-brachial index:    Carotid ultrasound screening:    Abdominal aortic aneurysm screening:    CXR: 1/22/23  Impression  1. No findings for acute cardiopulmonary disease. Assessment / Plan:     1. SVT (supraventricular tachycardia) (HCC)    2. Chest pain, unspecified type    3. Palpitations             No orders of the defined types were placed in this encounter. No follow-ups on file. The note was completed using EMR and Dragon dictation system. Every effort was made to ensure accuracy; however, inadvertent computerized transcription errors may be present. All questions and concerns were addressed to the patient. I would like to thank you for providing me the opportunity to participate in the care of your patient. If you have any questions, please do not hesitate to contact me.      Alondra Bocanegra MD, [address] [facility] [address]  [address] [place] [address] Dilia Chen [id number]  Main Office Phone: [phone]  Fax: [phone]

## 2023-02-06 ENCOUNTER — HOSPITAL ENCOUNTER (EMERGENCY)
Age: 28
Discharge: HOME | End: 2023-02-06
Attending: EMERGENCY MEDICINE
Payer: MEDICAID

## 2023-02-06 VITALS
TEMPERATURE: 98.9 F | OXYGEN SATURATION: 99 % | DIASTOLIC BLOOD PRESSURE: 59 MMHG | RESPIRATION RATE: 13 BRPM | SYSTOLIC BLOOD PRESSURE: 122 MMHG | HEART RATE: 70 BPM

## 2023-02-06 DIAGNOSIS — R00.2 PALPITATIONS: Primary | ICD-10-CM

## 2023-02-06 LAB
ANION GAP SERPL CALCULATED.3IONS-SCNC: 7 MMOL/L (ref 3–16)
BASOPHILS ABSOLUTE: 0 K/UL (ref 0–0.2)
BASOPHILS RELATIVE PERCENT: 0.6 %
BUN BLDV-MCNC: 10 MG/DL (ref 7–20)
CALCIUM SERPL-MCNC: 9.3 MG/DL (ref 8.3–10.6)
CHLORIDE BLD-SCNC: 103 MMOL/L (ref 99–110)
CO2: 25 MMOL/L (ref 21–32)
CREAT SERPL-MCNC: 0.7 MG/DL (ref 0.6–1.1)
EKG ATRIAL RATE: 67 BPM
EKG DIAGNOSIS: NORMAL
EKG P AXIS: 36 DEGREES
EKG P-R INTERVAL: 166 MS
EKG Q-T INTERVAL: 382 MS
EKG QRS DURATION: 76 MS
EKG QTC CALCULATION (BAZETT): 403 MS
EKG R AXIS: 43 DEGREES
EKG T AXIS: 9 DEGREES
EKG VENTRICULAR RATE: 67 BPM
EOSINOPHILS ABSOLUTE: 0.1 K/UL (ref 0–0.6)
EOSINOPHILS RELATIVE PERCENT: 1.7 %
GFR SERPL CREATININE-BSD FRML MDRD: >60 ML/MIN/{1.73_M2}
GLUCOSE BLD-MCNC: 138 MG/DL (ref 70–99)
HCG QUALITATIVE: NEGATIVE
HCT VFR BLD CALC: 38.4 % (ref 36–48)
HEMOGLOBIN: 12.6 G/DL (ref 12–16)
LYMPHOCYTES ABSOLUTE: 2.4 K/UL (ref 1–5.1)
LYMPHOCYTES RELATIVE PERCENT: 33.2 %
MAGNESIUM: 1.9 MG/DL (ref 1.8–2.4)
MCH RBC QN AUTO: 26.5 PG (ref 26–34)
MCHC RBC AUTO-ENTMCNC: 32.8 G/DL (ref 31–36)
MCV RBC AUTO: 80.7 FL (ref 80–100)
MONOCYTES ABSOLUTE: 0.8 K/UL (ref 0–1.3)
MONOCYTES RELATIVE PERCENT: 10.9 %
NEUTROPHILS ABSOLUTE: 4 K/UL (ref 1.7–7.7)
NEUTROPHILS RELATIVE PERCENT: 53.6 %
PDW BLD-RTO: 14.5 % (ref 12.4–15.4)
PLATELET # BLD: 316 K/UL (ref 135–450)
PMV BLD AUTO: 8.1 FL (ref 5–10.5)
POTASSIUM REFLEX MAGNESIUM: 4.3 MMOL/L (ref 3.5–5.1)
RBC # BLD: 4.76 M/UL (ref 4–5.2)
SODIUM BLD-SCNC: 135 MMOL/L (ref 136–145)
T4 FREE: 1 NG/DL (ref 0.9–1.8)
TSH SERPL DL<=0.05 MIU/L-ACNC: 3.97 UIU/ML (ref 0.27–4.2)
WBC # BLD: 7.4 K/UL (ref 4–11)

## 2023-02-06 PROCEDURE — 84703 CHORIONIC GONADOTROPIN ASSAY: CPT

## 2023-02-06 PROCEDURE — 36415 COLL VENOUS BLD VENIPUNCTURE: CPT

## 2023-02-06 PROCEDURE — 6370000000 HC RX 637 (ALT 250 FOR IP): Performed by: STUDENT IN AN ORGANIZED HEALTH CARE EDUCATION/TRAINING PROGRAM

## 2023-02-06 PROCEDURE — 85025 COMPLETE CBC W/AUTO DIFF WBC: CPT

## 2023-02-06 PROCEDURE — 93005 ELECTROCARDIOGRAM TRACING: CPT | Performed by: STUDENT IN AN ORGANIZED HEALTH CARE EDUCATION/TRAINING PROGRAM

## 2023-02-06 PROCEDURE — 84439 ASSAY OF FREE THYROXINE: CPT

## 2023-02-06 PROCEDURE — 83735 ASSAY OF MAGNESIUM: CPT

## 2023-02-06 PROCEDURE — 99283 EMERGENCY DEPT VISIT LOW MDM: CPT

## 2023-02-06 PROCEDURE — 84443 ASSAY THYROID STIM HORMONE: CPT

## 2023-02-06 PROCEDURE — 99284 EMERGENCY DEPT VISIT MOD MDM: CPT

## 2023-02-06 PROCEDURE — 80048 BASIC METABOLIC PNL TOTAL CA: CPT

## 2023-02-06 RX ORDER — HYDROXYZINE HYDROCHLORIDE 10 MG/1
10 TABLET, FILM COATED ORAL ONCE
Status: COMPLETED | OUTPATIENT
Start: 2023-02-06 | End: 2023-02-06

## 2023-02-06 RX ADMIN — HYDROXYZINE HYDROCHLORIDE 10 MG: 10 TABLET, FILM COATED ORAL at 02:45

## 2023-02-06 ASSESSMENT — PAIN - FUNCTIONAL ASSESSMENT
PAIN_FUNCTIONAL_ASSESSMENT: NONE - DENIES PAIN
PAIN_FUNCTIONAL_ASSESSMENT: NONE - DENIES PAIN

## 2023-02-06 NOTE — ED PROVIDER NOTES
ED Attending Attestation Note     Date of evaluation: 2/6/2023    This patient was seen by the resident. I have seen and examined the patient, agree with the workup, evaluation, management and diagnosis. The care plan has been discussed. I have reviewed the ECG and concur with the resident's interpretation. My assessment reveals patient with recently diagnosed SVT presents into the emergency department complaining of palpitations. Patient states since the time she was diagnosed she has been having intermittent palpitations daily to the point where is difficult for her to sleep or work. On arrival, patient has normal heart rate in the 70s with sinus on the monitor. Will check laboratory studies, observe any emergency department.      Stefania Byrd MD  02/06/23 1710

## 2023-02-06 NOTE — DISCHARGE INSTRUCTIONS
You were seen in the emergency department for palpitations. Your labs and/or imaging were unremarkable      I recommend that you follow up with [facility] primary care physician in the next 3 days as well as cardiology tomorrow for further evaluation and management. Please return to the emergency department if you develop a fever, have new or worsening symptoms including headache, abdominal pain, nausea, vomiting, diarrhea, chest pain or chest tightness, feel as if you are going to pass out / pass out, or have any other new or concerning symptoms.

## 2023-02-06 NOTE — ED PROVIDER NOTES
[address] [facility] RESIDENT NOTE       Date of evaluation: 2/6/2023    Chief Complaint     Palpitations (Pt states she feels like she has been in and out of SVT tonight)      History of Present Illness     Gerda Conroy is a 32 y.o. female with a PMHx of SVT, TIA, further history as noted below who presents to the emergency department today with concerns for tachycardia. Patient states that she has recent diagnosis of SVT. She is currently taking metoprolol and has taken her doses, not missing any. Patient states that she has been having frequent episodes of supraventricular tachycardia where she feels her chest is fluttering. She endorses intermittent chest pain sometimes with the symptoms and other times she denies any chest pain or shortness of breath. However, her symptoms are almost always associated with presyncopal symptoms and nausea, feeling as if she is going to pass out. She however has not lost consciousness. Patient states that she has fall a audiologist home for evaluation. However, her symptoms have become quite distressing for her and she is unable to participate in activities around the house, specifically taking care of her 3 children. She states that her symptoms are not made worse by any particular activity and oftentimes start spontaneously, sometimes waking her from sleep. Occur when she is stressed. She oftentimes tries to take deep breaths which will resolve her symptoms. She denies any recent fevers, aches, or chills. No vomiting. No abdominal pain. She has no shortness of breath. No diarrhea or constipation. Other than that mentioned above, there are no other alleviating or provoking factors associated with the patient's presentation today.     Review of Systems     Review of Systems    Review of systems is positive for palpitations, intermittent chest pain, presyncopal symptoms/lightheadedness  Review of systems is negative for syncope, headache, vision changes  Further review of systems is negative other than that mentioned in the HPI. Past Medical, Surgical, Family, and Social History     She has a past medical history of Hypertension and TIA (transient ischemic attack). She has no past surgical history on file. Her family history is not on file. She reports that she has quit smoking. She has never used smokeless tobacco. She reports that she does not drink alcohol and does not use drugs. Medications     Previous Medications    METOPROLOL TARTRATE (LOPRESSOR) 25 MG TABLET    Take 1 tablet by mouth 2 times daily       Allergies     She is allergic to dilaudid [hydromorphone hcl]. Physical Exam     INITIAL VITALS: BP: (!) 122/59, Temp: 98.9 °F (37.2 °C), Heart Rate: 70, Resp: 13, SpO2: 99 %     Gen: NAD, in bed comfortably, non-diaphoretic   Head/Eyes: Atraumatic. PERRL. EOMI bilaterally. No conjunctival injection or scleral icterus   ENT: Neck supple, trachea midline, no LAD. MMM, no posterior oropharyngeal erythema or exudate. External auditory meatus clear without discharge or erythema. No nasal congestion or discharge. Cardiovascular: RRR no murmurs, rubs, or gallops. Pulmonary:Lungs CTAB, no rales, wheezes, or ronchi   Abdominal: Soft, non-tender. No rebound or guarding. Non-peritoneal   MSK: no obvious long bone deformity. Skin:  Warm, dry. No rashes, ecchymosis or cyanosis. Neuro: Alert and oriented x 4. Cranial nerves grossly intact. Moving all extremities equally. Gait narrow and stable. Extremities:  No peripheral edema. Psych: Euthymic affect. Normal rate and rhythm of speech with full prosody. Linear thought process.      DiagnosticResults     EKG   None  RADIOLOGY:  No orders to display       LABS:   Results for orders placed or performed during the hospital encounter of 02/06/23   CBC with Auto Differential   Result Value Ref Range    WBC 7.4 4.0 - 11.0 K/uL    RBC 4.76 4.00 - 5.20 M/uL Hemoglobin 12.6 12.0 - 16.0 g/dL    Hematocrit 38.4 36.0 - 48.0 %    MCV 80.7 80.0 - 100.0 fL    MCH 26.5 26.0 - 34.0 pg    MCHC 32.8 31.0 - 36.0 g/dL    RDW 14.5 12.4 - 15.4 %    Platelets 908 057 - 925 K/uL    MPV 8.1 5.0 - 10.5 fL    Neutrophils % 53.6 %    Lymphocytes % 33.2 %    Monocytes % 10.9 %    Eosinophils % 1.7 %    Basophils % 0.6 %    Neutrophils Absolute 4.0 1.7 - 7.7 K/uL    Lymphocytes Absolute 2.4 1.0 - 5.1 K/uL    Monocytes Absolute 0.8 0.0 - 1.3 K/uL    Eosinophils Absolute 0.1 0.0 - 0.6 K/uL    Basophils Absolute 0.0 0.0 - 0.2 K/uL   BMP w/ Reflex to MG   Result Value Ref Range    Sodium 135 (L) 136 - 145 mmol/L    Potassium reflex Magnesium 4.3 3.5 - 5.1 mmol/L    Chloride 103 99 - 110 mmol/L    CO2 25 21 - 32 mmol/L    Anion Gap 7 3 - 16    Glucose 138 (H) 70 - 99 mg/dL    BUN 10 7 - 20 mg/dL    Creatinine 0.7 0.6 - 1.1 mg/dL    Est, Glom Filt Rate >60 >60    Calcium 9.3 8.3 - 10.6 mg/dL   Magnesium   Result Value Ref Range    Magnesium 1.90 1.80 - 2.40 mg/dL   HCG Qualitative, Serum   Result Value Ref Range    hCG Qual Negative Detects HCG level >10 MIU/mL       ED BEDSIDE ULTRASOUND:  None    RECENT VITALS:  BP: (!) 122/59, Temp: 98.9 °F (37.2 °C), Heart Rate: 70,Resp: 13, SpO2: 99 %     Procedures     None    ED Course     Nursing Notes, Past Medical Hx, Past Surgical Hx, Social Hx, Allergies, and Family Hx were reviewed. The patient was given the followingmedications:  No orders of the defined types were placed in this encounter. CONSULTS:  None      MEDICAL DECISION MAKING / ASSESSMENT / Royalhoang Iverson is a 32 y.o. femalewith a history of present illness, physical examination, past medical history as noted above who presents to the emergency department today with palpitations and concern for tachycardia.       ED Course as of 02/06/23 0227 Mon Feb 06, 2023   0117 EKG obtained here in the emergency department reveals a normal sinus rhythm with a ventricular rate of 67 bpm.  Parable is within normal limits. QRS is narrow. No QT or QTc prolongation. Normal axis. No ST elevation or depression. No STEMI. Patient is noted to have T wave inversion in lead III and T wave flattening in aVF, nonspecific findings. [JF]   0117 No significant changes from prior EKG 1/22/2023. [JF]   0117 Patient is a 51-year-old female with history of recently diagnosed supraventricular tachycardia on metoprolol 0.5 mg twice daily who is presenting to the emergency department today with concerns for frequent episodes of tachycardia associated with lightheadedness but no syncopal episodes. On arrival, the patient is hemodynamically stable. She is not tachycardic. Her lungs are clear to auscultation bilaterally. There is no evidence of volume overload on physical examination that would suggest acute valvulopathy or heart failure. Patient is not in an arrhythmia at this time that would require intervention. EKG obtained here in the emergency department reveals normal sinus rhythm, unchanged from prior EKG. No evidence of ischemia or significant interval prolongation. History and presentation is inconsistent with ischemic pathology and thus do not believe patient requires troponin. However, will obtain basic labs including CBC to evaluate for underlying anemia, basic metabolic panel to evaluate for underlying electrolyte abnormalities, as well as TSH and free T4. TSH and free T4 will not result today and thus will require follow-up with her cardiologist tomorrow the patient has been notified of this. However, previous TSH was within normal limits less than a year ago. Further, considered pulmonary embolism though history and presentation is inconsistent as patient does not have significant risk factors nor is she tachycardic or hypoxic. Do not believe patient requires D-dimer testing or imaging.  [JF]   0202 CBC with Auto Differential:    WBC 7.4   RBC 4.76   Hemoglobin Quant 12.6   Hematocrit 38.4   MCV 80.7   MCH 26.5   MCHC 32.8   RDW 14.5   Platelet Count 242   MPV 8.1   Neutrophils % 53.6   Lymphocyte % 33.2   Monocytes % 10.9   Eosinophils % 1.7   Basophils % 0.6   Neutrophils Absolute 4.0   Lymphocytes Absolute 2.4   Monocytes Absolute 0.8   Eosinophils Absolute 0.1   Basophils Absolute 0.0  Cbc without leukocytosis or anemia. Pregnancy test negative. [JF]   0225 BMP w/ Reflex to MG(!):    Sodium 135(!)   Potassium 4.3   Chloride 103   CO2 25   Anion Gap 7   Glucose, Random 138(!)   BUN,BUNPL 10   Creatinine 0.7   Est, Glom Filt Rate >60   CALCIUM, SERUM, 718448 9.3  Patient without significant electrolyte abnormalities or acute kidney injury. Magnesium is within normal limits. Reassuring. Given reassuring history, physical examination, vitals, as well as telemetry monitoring while here in the emergency department without recurrence of symptoms or hemodynamic instability, I believe patient is appropriate for discharge with her outpatient follow-up with her cardiologist tomorrow. Patient was instructed to follow-up with her cardiologist for further evaluation including likely Holter monitor and possible additional cardiovascular testing. Patient is ambulating without assistance, tolerating p.o. [JF]      ED Course User Index  [JF] Jyoti Carballo MD   Addendum:  Patient given hydroxyzine 10 mg at discharge for reported anxiety and difficulty sleeping. Medical Decision Making  Amount and/or Complexity of Data Reviewed  Labs: ordered. Decision-making details documented in ED Course. Risk  Prescription drug management. This patient was also evaluated by the attending physician. All care plans werediscussed and agreed upon. Clinical Impression     1.  Palpitations        Disposition     [facility]    Schedule an appointment as soon as possible for a visit in 3 days      [facility] Emergency Department  [facility] [id number] [phone]  Go to   As needed, If symptoms worsen    Cardiologist, follow up with your scheduled appointment tomorrow          DISCHARGE MEDICATIONS:  New Prescriptions    No medications on file       DISPOSITION Decision To Discharge 02/06/2023 01:42:47 AM  At this time, the patient was deemed appropriate for discharge. All laboratory and imaging findings were discussed with the patient, and the patient was given the opportunity to ask questions. All questions were answered to their satisfaction. At this time, the patient was ready to be discharged. I recommended that the patient follow up with their primary care physician in the next week to ensure symptom improvement and continued evaluation and management.  I further recommended that the patient follow up with cardiology and I have provided them a referral.        Alfred Leiva MD  02/06/23 [facility] MD Jeni  02/06/23 6898

## 2023-02-06 NOTE — ED NOTES
Discharge instructions given per provider order. Patient verbalized understanding.         Asuncion Alonzo RN  02/06/23 8280

## 2023-02-07 ENCOUNTER — TELEPHONE (OUTPATIENT)
Dept: CARDIOLOGY CLINIC | Age: 28
End: 2023-02-07

## 2023-02-07 ENCOUNTER — OFFICE VISIT (OUTPATIENT)
Dept: CARDIOLOGY CLINIC | Age: 28
End: 2023-02-07

## 2023-02-07 ENCOUNTER — HOSPITAL ENCOUNTER (EMERGENCY)
Age: 28
Discharge: HOME | End: 2023-02-07
Attending: EMERGENCY MEDICINE
Payer: MEDICAID

## 2023-02-07 VITALS
DIASTOLIC BLOOD PRESSURE: 86 MMHG | HEART RATE: 64 BPM | BODY MASS INDEX: 47.09 KG/M2 | TEMPERATURE: 98.1 F | RESPIRATION RATE: 18 BRPM | SYSTOLIC BLOOD PRESSURE: 134 MMHG | HEIGHT: 66 IN | OXYGEN SATURATION: 100 % | WEIGHT: 293 LBS

## 2023-02-07 VITALS
SYSTOLIC BLOOD PRESSURE: 132 MMHG | HEIGHT: 66 IN | HEART RATE: 74 BPM | WEIGHT: 293 LBS | DIASTOLIC BLOOD PRESSURE: 78 MMHG | BODY MASS INDEX: 47.09 KG/M2

## 2023-02-07 DIAGNOSIS — R00.2 PALPITATIONS: ICD-10-CM

## 2023-02-07 DIAGNOSIS — R42 DIZZINESS: Primary | ICD-10-CM

## 2023-02-07 DIAGNOSIS — R06.83 SNORING: ICD-10-CM

## 2023-02-07 DIAGNOSIS — I47.1 SVT (SUPRAVENTRICULAR TACHYCARDIA) (HCC): Primary | ICD-10-CM

## 2023-02-07 LAB
EKG ATRIAL RATE: 68 BPM
EKG DIAGNOSIS: NORMAL
EKG P AXIS: 32 DEGREES
EKG P-R INTERVAL: 166 MS
EKG Q-T INTERVAL: 404 MS
EKG QRS DURATION: 84 MS
EKG QTC CALCULATION (BAZETT): 429 MS
EKG R AXIS: 39 DEGREES
EKG T AXIS: 13 DEGREES
EKG VENTRICULAR RATE: 68 BPM
GLUCOSE BLD-MCNC: 112 MG/DL (ref 70–99)
PERFORMED ON: ABNORMAL

## 2023-02-07 PROCEDURE — 93005 ELECTROCARDIOGRAM TRACING: CPT | Performed by: EMERGENCY MEDICINE

## 2023-02-07 RX ORDER — ASPIRIN 81 MG/1
81 TABLET, CHEWABLE ORAL DAILY
COMMUNITY
End: 2023-02-08 | Stop reason: SDUPTHER

## 2023-02-07 ASSESSMENT — PAIN - FUNCTIONAL ASSESSMENT
PAIN_FUNCTIONAL_ASSESSMENT: NONE - DENIES PAIN
PAIN_FUNCTIONAL_ASSESSMENT: NONE - DENIES PAIN

## 2023-02-07 NOTE — DISCHARGE INSTRUCTIONS
Please hold your morning dose of metoprolol and discuss this further with your cardiologist at your appointment tomorrow. Your EKG and troponin did not show any sign of heart damage today. Your chest x ray was normal.    Return to the ED immediately if you have worse chest pain, shortness of breath, nausea, sweating during chest pain, trouble breathing, chest pain with exertion (climbing stairs or walking for example), or any other concerns.

## 2023-02-07 NOTE — PROGRESS NOTES
[address] [facility]     Outpatient Cardiology         Patient Name:  Alin Sandhu  Requesting Physician: Kp Pope MD  Primary Care Physician: X [address]    Reason for Consultation/Chief Complaint:   Chief Complaint   Patient presents with    New Patient       HPI:     32year old female with history of HTN, TIA, referred my [facility] ED MD for chest pain and SVT. Patient was seen in [facility] ED 1/22/23 with complaints of chest pain, palpitations, and SVT with HR in 200s upon EMS arrival. Patient was able to perform vagal maneuver while in EMS care and converted to NSR with HR in 100s. Further work up in ED revealed no acute findings and patient was released later that day. Complains of palpitations for months with frequent ED visits. No documented SVT, only reported per EMS    Rapid palpitations associated with diaphoresis. These occur about once a week and stop on it's own. Does not feel like betablocker is helping symptoms. Reported dizziness since being on metoprolol. The ED physician recommended stopping it until seen by cardiology. Admits to heavy caffeine intake with coffee and energy drinks. She has stopped drinking these. Admits that she snores and apneic periods    No immediate family h/o CAD    Histories:     Past Medical History:   has a past medical history of Hypertension and TIA (transient ischemic attack). Surgical History:   has no past surgical history on file. Social History:   reports that she has quit smoking. She has never used smokeless tobacco. She reports that she does not drink alcohol and does not use drugs. Family History:  No evidence for sudden cardiac death or premature CAD    Medications:     Home Medications:  Were reviewed and are listed in nursing record. and/or listed below    Prior to Admission medications    Medication Sig Start Date End Date Taking?  Authorizing Provider   aspirin 81 MG chewable tablet Take 81 mg by mouth daily   Yes Historical Provider, MD   metoprolol tartrate (LOPRESSOR) 25 MG tablet Take 1 tablet by mouth 2 times daily 1/22/23  Yes Mitzi Mayo MD        Allergy:     Dilaudid [hydromorphone hcl]     Review of Systems:     Review of Systems    Physical Examination:     Vitals:    02/07/23 1553   BP: 132/78   Pulse: 74   Weight: (!) 340 lb 12.8 oz (154.6 kg)   Height: 5' 6\" (1.676 m)       Wt Readings from Last 3 Encounters:   02/07/23 (!) 340 lb 12.8 oz (154.6 kg)   02/07/23 (!) 336 lb 14.4 oz (152.8 kg)   01/22/23 (!) 342 lb (155.1 kg)       Physical Exam  Constitutional:       Appearance: Normal appearance. HENT:      Head: Normocephalic and atraumatic. Nose: Nose normal.   Eyes:      Conjunctiva/sclera: Conjunctivae normal.   Cardiovascular:      Rate and Rhythm: Normal rate and regular rhythm. Heart sounds: Normal heart sounds. Pulmonary:      Effort: Pulmonary effort is normal.      Breath sounds: Normal breath sounds. Abdominal:      Palpations: Abdomen is soft. Musculoskeletal:      Cervical back: Neck supple. Skin:     General: Skin is warm and dry. Neurological:      General: No focal deficit present. Mental Status: She is alert.          Labs:     Lab Results   Component Value Date    WBC 7.4 02/06/2023    HGB 12.6 02/06/2023    HCT 38.4 02/06/2023    MCV 80.7 02/06/2023     02/06/2023     Lab Results   Component Value Date     (L) 02/06/2023    K 4.3 02/06/2023     02/06/2023    CO2 25 02/06/2023    BUN 10 02/06/2023    CREATININE 0.7 02/06/2023    GLUCOSE 138 (H) 02/06/2023    CALCIUM 9.3 02/06/2023    LABGLOM >60 02/06/2023    GFRAA >60 11/09/2016         No results found for: CHOL  No results found for: TRIG  No results found for: HDL  No results found for: LDLCHOLESTEROL, LDLCALC  No results found for: LABVLDL, VLDL  No results found for: CHOLHDLRATIO    No results found for: INR, PROTIME    The ASCVD Risk score (Chun KLEIN, et al., 2019) failed to calculate for the following reasons: The 2019 ASCVD risk score is only valid for ages 36 to 78      Imaging:       ECG (if available, Personally interpreted):    Last Monitor/Holter (if available):    Last Stress (if available):    Last Cath (if available):    Last TTE/ISRAEL(if available) 9/2/22:  Study Conclusions     - Left ventricle: The cavity size is normal. Wall thickness is normal. Systolic function was normal.     The estimated ejection fraction was in the range of 50% to 55%. Although no diagnostic regional     wall motion abnormality was identified, this possibility cannot be completely excluded on the     basis of this study. Left ventricular diastolic function parameters were normal. There is no     evidence of a thrombus revealed by acoustic contrast opacification.   - Right ventricle: Systolic function was normal by visual assessment. - Atrial septum: A patent foramen ovale cannot be excluded. Agitated saline contrast study at     baseline or with provocation, shows no right-to-left atrial level shunt.   - Pulmonary arteries: Systolic pressure was estimated to be 24mm Hg + RAP. Last CMR  (if available):    Last Coronary Artery Calcium Score: Ankle-brachial index:    Carotid ultrasound screening:    Abdominal aortic aneurysm screening:    CXR: 1/22/23  Impression  1. No findings for acute cardiopulmonary disease. Assessment / Plan:     1. SVT (supraventricular tachycardia) (HCC)    2. Chest pain, unspecified type    3. Palpitations       Obtain 7 day CAM to correlate rhythm with symptoms    May use Lopressor 25 mg BID as needed for prolonged episode    Refer to Dr. Cipriano Barone for FRANSICO evaluation    Follow up with me after the monitor    Orders Placed This Encounter   Procedures    EKG 12 lead          No follow-ups on file. The note was completed using EMR and Dragon dictation system.  Every effort was made to ensure accuracy; however, inadvertent computerized transcription errors may be present. All questions and concerns were addressed to the patient. I would like to thank you for providing me the opportunity to participate in the care of your patient. If you have any questions, please do not hesitate to contact me. Layla Zaman MD, [facility] [address] Office Phone: [phone]  Fax: [phone]    Physician Attestation:  The scribes documentation has been prepared under my direction and personally reviewed by me in its entirety. I confirm the note above accurately reflects all work, treatment, procedures, and medical decision making performed by me.     Electronically signed by Layla Zaman MD on 2/17/2023 at 10:30 AM

## 2023-02-07 NOTE — ED PROVIDER NOTES
[address] RESIDENT NOTE       Date of evaluation: 2/6/2023    Chief Complaint     Numbness (Patient presents to ER c/o numbness and dizziness. She reports that the symptoms start when she takes her metoprolol. She reports that she had similar symptoms 6 months ago when she was taking the medication so she stopped taking it. She reports that the symptoms start after she takes her morning dose and worsen throughout the day. She is scheduled to see her cardiologist tomorrow at [place] [address]. )      History of Present Illness     Paras Gama is a 32 y.o. female with PMH significant for SVT on metoprolol, anxiety who presents for evaluation of dizziness. The patient reports that she was just here in the emergency department, since then she has taken 2 doses of metoprolol. They have caused dizziness, nonvertiginous nature, not described as lightheadedness, no syncope. She has had no chest pain or shortness of breath. Her last dose was at 8 PM last night. She has been on metoprolol on and off, last started 2 weeks ago once again. She does have an appointment with her cardiologist tomorrow at 4 PM.  She otherwise denies any recent fevers, chills, nausea, vomiting, dysuria, urgency, frequency. She feels like she is \"numb all over\"    Other than stated above, no additional aggravating or alleviating factors are noted. MEDICAL DECISION MAKING / ASSESSMENT / PLAN     INITIAL VITALS: BP: 134/86, Temp: 98.1 °F (36.7 °C), Heart Rate: 64, Resp: 18, SpO2: 100 %    Gerda Conroy is a 32 y.o. female with a history and presentation as described above in HPI. The patient was evaluated by myself and the ED Attending Physician, Dr. Luann Patrick. All management and disposition plans were discussed and agreed upon. Upon presentation, the patient was  anxious appearing , afebrile and hemodynamically stable.     Patient has a history of SVT on metoprolol, anxiety disorder who presents with dizziness and subjective numbness. POC glucose wnl. Her exam reveals no focal neurological deficits. She may be having adverse effects from metoprolol dose. We will instruct her to hold next dose, discuss this further with her regularly scheduled cardiology appointment tomorrow. TSH and T4 were pending as of visit less than 24 hours ago which are negative. Otherwise her laboratory values from earlier visit are reviewed, unremarkable, no acute changes to the warrant repeat. Medical Decision Making  Problems Addressed:  Dizziness: acute illness or injury    Amount and/or Complexity of Data Reviewed  External Data Reviewed: notes. Labs: ordered. ECG/medicine tests: ordered. This patient was also evaluated by the attending physician. All care plans werediscussed and agreed upon. Clinical Impression     1. Dizziness        Disposition     PATIENT REFERRED TO:  No follow-up provider specified. DISCHARGE MEDICATIONS:  New Prescriptions    No medications on file       DISPOSITION Decision To Discharge 02/07/2023 12:59:06 AM        Diagnostic Results and Other Data     RADIOLOGY:  No orders to display       LABS:   Results for orders placed or performed during the hospital encounter of 02/07/23   POCT Glucose   Result Value Ref Range    POC Glucose 112 (H) 70 - 99 mg/dl    Performed on ACCU-CHEK      EKG   Interpreted in conjunction with emergencydepartment physician Devi Rivers MD  Rhythm: normal sinus   Rate: normal  Axis: normal  Ectopy: none  Conduction: normal  ST Segments: no acute change  T Waves:inversion in  III  Q Waves: none  Clinical Impression: no acute changes  Comparison:  2/6/23    ED BEDSIDE ULTRASOUND:  No results found. RECENT VITALS:  BP: 134/86, Temp: 98.1 °F (36.7 °C), Heart Rate: 64,Resp: 18, SpO2: 100 %     Procedures         ED Course     Nursing Notes, Past Medical Hx, Past Surgical Hx, Social Hx, Allergies, and Family Hx were reviewed.          The patient was given the following medications:  No orders of the defined types were placed in this encounter. CONSULTS:  None    Review of Systems     ROS as stated above, all other systems reviewed and are negative. Past Medical, Surgical, Family, and Social History     She has a past medical history of Hypertension and TIA (transient ischemic attack). She has no past surgical history on file. Her family history is not on file. She reports that she has quit smoking. She has never used smokeless tobacco. She reports that she does not drink alcohol and does not use drugs. Medications     Previous Medications    ASPIRIN 81 MG CHEWABLE TABLET    Take 81 mg by mouth daily    METOPROLOL TARTRATE (LOPRESSOR) 25 MG TABLET    Take 1 tablet by mouth 2 times daily       Allergies     She is allergic to dilaudid [hydromorphone hcl]. Physical Exam     INITIAL VITALS: BP: 134/86, Temp: 98.1 °F (36.7 °C), Heart Rate: 64, Resp: 18, SpO2: 100 %   Physical Exam    General:   Anxious appearing . Eyes:  Pupils reactive. No discharge from eyes   ENT:  No discharge from nose. OP clear  Neck:  Supple, trachea midline  Pulmonary:   Non-labored breathing. Breath sounds clear bilaterally  Cardiac:  Regular rate and rhythm. No murmurs  Abdomen:  Soft. Non-tender. Non-distended  Musculoskeletal:  No long bone deformity. No CVA tenderness  Vascular:  Extremities warm and perfused. Skin:  Dry, no rashes  Neuro:  Alert and oriented x3. Moves all four extremities to command. No focal deficit  CN II-XII intact. Sensation grossly intact to light touch.  Speech and mentation normal.  Psych: Appropriate mood and affect           Radha Stockton MD  Resident  02/07/23 8895

## 2023-02-07 NOTE — ED PROVIDER NOTES
ED Attending Attestation Note     Date of evaluation: 2/6/2023    This patient was seen by the resident. I have seen and examined the patient, agree with the workup, evaluation, management and diagnosis. The care plan has been discussed. I have reviewed the ECG and concur with the resident's interpretation. I was present for any procedures performed in the resident's  note and have made edits to the note where appropriate. My assessment reveals 32 y.o. female with history of SVT presenting for numbness and dizziness after taking metoprolol that was recently reinitiated. Here she is in no distress, heart regular rate and rhythm, no murmurs, lungs clear to auscultation. No gross neurologic deficits.         Trudi Marinelli MD  02/07/23 [id number]

## 2023-02-08 ENCOUNTER — TELEPHONE (OUTPATIENT)
Dept: CARDIOLOGY CLINIC | Age: 28
End: 2023-02-08

## 2023-02-08 RX ORDER — ASPIRIN 81 MG/1
81 TABLET, CHEWABLE ORAL DAILY
Qty: 90 TABLET | Refills: 1 | Status: SHIPPED | OUTPATIENT
Start: 2023-02-08

## 2023-02-08 NOTE — TELEPHONE ENCOUNTER
Pt would like a prescription sent to [facility]     Address:[address], [place], [place] [address]  aspirin 81 MG chewable tablet  Take 81 mg by mouth daily   Phone: [phone]

## 2023-02-09 ENCOUNTER — TELEPHONE (OUTPATIENT)
Dept: CARDIOLOGY CLINIC | Age: 28
End: 2023-02-09

## 2023-02-09 NOTE — TELEPHONE ENCOUNTER
Called and spoke with patient, told her we can get a new monitor on her today if she is able to come into the office before 430pm, if not able to make it today she can come in tomorrow anytime before 9am  and 430pm to have a new monitor placed. Asked her to bring old monitor with her and we will download it for her while she is wearing the new monitor.

## 2023-02-09 NOTE — TELEPHONE ENCOUNTER
Patient called stating she was supposed to where a monitor for 7 days but it keeps coming off and she's only worn it for two.  Please reach patient regarding placing a different kind

## 2023-02-09 NOTE — TELEPHONE ENCOUNTER
Pt called office to ask for  to prescribe something for her dizziness. She said she cant work, cook, clean or bath she is too dizzy and this has been going on for a while. She told me this is emergent and she wants me to reach out to the provider. I said if she felt it was that urgent I would recommend the ER.  She said they would just have her call her cardiologist. Please advise [phone]

## 2023-02-15 PROBLEM — I47.1 SVT (SUPRAVENTRICULAR TACHYCARDIA) (HCC): Status: ACTIVE | Noted: 2022-09-02

## 2023-02-15 PROBLEM — I47.10 SVT (SUPRAVENTRICULAR TACHYCARDIA): Status: ACTIVE | Noted: 2022-09-02

## 2023-02-15 PROBLEM — E66.813 CLASS 3 SEVERE OBESITY DUE TO EXCESS CALORIES WITHOUT SERIOUS COMORBIDITY WITH BODY MASS INDEX (BMI) OF 50.0 TO 59.9 IN ADULT: Chronic | Status: ACTIVE | Noted: 2023-02-15

## 2023-02-15 PROBLEM — E66.01 CLASS 3 SEVERE OBESITY DUE TO EXCESS CALORIES WITHOUT SERIOUS COMORBIDITY WITH BODY MASS INDEX (BMI) OF 50.0 TO 59.9 IN ADULT (HCC): Chronic | Status: ACTIVE | Noted: 2023-02-15

## 2023-02-15 PROBLEM — I47.1 SVT (SUPRAVENTRICULAR TACHYCARDIA) (HCC): Chronic | Status: ACTIVE | Noted: 2022-09-02

## 2023-02-15 PROBLEM — I47.10 SVT (SUPRAVENTRICULAR TACHYCARDIA): Chronic | Status: ACTIVE | Noted: 2022-09-02

## 2023-02-17 DIAGNOSIS — R00.2 PALPITATIONS: Primary | ICD-10-CM

## 2023-02-24 DIAGNOSIS — R55 SYNCOPE AND COLLAPSE: Primary | ICD-10-CM

## 2023-03-01 ENCOUNTER — HOSPITAL ENCOUNTER (OUTPATIENT)
Dept: SLEEP CENTER | Age: 28
Discharge: HOME | End: 2023-03-01
Payer: MEDICAID

## 2023-03-01 DIAGNOSIS — R06.83 SNORING: ICD-10-CM

## 2023-03-01 DIAGNOSIS — G47.10 HYPERSOMNIA: ICD-10-CM

## 2023-03-01 PROCEDURE — 95810 POLYSOM 6/> YRS 4/> PARAM: CPT

## 2023-03-03 NOTE — PROGRESS NOTES
[facility]     Outpatient Cardiology         Patient Name:  Alin Sandhu  Requesting Physician: Kp Pope MD  Primary Care Physician: X [address]    Reason for Consultation/Chief Complaint:   Chief Complaint   Patient presents with    Follow-up         HPI:     32year old female with history of HTN, TIA, referred my [facility] ED MD for chest pain and SVT, presents for follow up    Noted to have sleep apnea on recent study. Has not had CPAP titration yet. Recent CAM occasional PACs and PVCs; otherwise no significant arrhythmias      Histories:     Past Medical History:   has a past medical history of Class 3 severe obesity due to excess calories without serious comorbidity with body mass index (BMI) of 50.0 to 59.9 in adult [facility]), Hypertension, SVT (supraventricular tachycardia) ([address] and TIA (transient ischemic attack). Surgical History:   has no past surgical history on file. Social History:   reports that she quit smoking about 2 years ago. Her smoking use included cigarettes. She has a 0.40 pack-year smoking history. She has never used smokeless tobacco. She reports that she does not drink alcohol and does not use drugs. Family History:  No evidence for sudden cardiac death or premature CAD    Medications:     Home Medications:  Were reviewed and are listed in nursing record. and/or listed below    Prior to Admission medications    Medication Sig Start Date End Date Taking?  Authorizing Provider   aspirin 81 MG chewable tablet Take 1 tablet by mouth daily 2/8/23  Yes Devi Knott MD   metoprolol tartrate (LOPRESSOR) 25 MG tablet Take 1 tablet by mouth 2 times daily 1/22/23  Yes Kp Pope MD        Allergy:     Dilaudid [hydromorphone hcl]     Review of Systems:     Review of Systems    Physical Examination:     Vitals:    03/07/23 1541   BP: 128/78   Pulse: 87   SpO2: 99%   Weight: (!) 338 lb 3.2 oz (153.4 kg)   Height: 5' 6\" (1.676 m)         Wt Readings from Last 3 Encounters:   03/07/23 (!) 338 lb 3.2 oz (153.4 kg)   02/15/23 (!) 335 lb (152 kg)   02/07/23 (!) 340 lb 12.8 oz (154.6 kg)       Physical Exam  Constitutional:       Appearance: Normal appearance. HENT:      Head: Normocephalic and atraumatic. Nose: Nose normal.   Eyes:      Conjunctiva/sclera: Conjunctivae normal.   Cardiovascular:      Rate and Rhythm: Normal rate and regular rhythm. Heart sounds: Normal heart sounds. Pulmonary:      Effort: Pulmonary effort is normal.      Breath sounds: Normal breath sounds. Abdominal:      Palpations: Abdomen is soft. Musculoskeletal:      Cervical back: Neck supple. Skin:     General: Skin is warm and dry. Neurological:      General: No focal deficit present. Mental Status: She is alert. Labs:     Lab Results   Component Value Date    WBC 7.4 02/06/2023    HGB 12.6 02/06/2023    HCT 38.4 02/06/2023    MCV 80.7 02/06/2023     02/06/2023     Lab Results   Component Value Date     (L) 02/06/2023    K 4.3 02/06/2023     02/06/2023    CO2 25 02/06/2023    BUN 10 02/06/2023    CREATININE 0.7 02/06/2023    GLUCOSE 138 (H) 02/06/2023    CALCIUM 9.3 02/06/2023    LABGLOM >60 02/06/2023    GFRAA >60 11/09/2016         No results found for: CHOL  No results found for: TRIG  No results found for: HDL  No results found for: LDLCHOLESTEROL, LDLCALC  No results found for: LABVLDL, VLDL  No results found for: CHOLHDLRATIO    No results found for: INR, PROTIME    The ASCVD Risk score (Neely DK, et al., 2019) failed to calculate for the following reasons: The 2019 ASCVD risk score is only valid for ages 36 to 78      Imaging:       ECG (if available, Personally interpreted):    Last Monitor/Holter (if available): 2 day CAM, 2/7/23-2/9/23  NSR, Avg HR 71 bpm, Min HR 42 bpm, Max  bpm  1 pause lasting up to 2.5 seconds.    PAC 1.52%, 0% PVC  I button press coorelates to NSR    5 day CAM, 2/9/23-2/14/23  NSR, Avg HR 69 bpm, Min HR 41 bpm, Max  bpm  AC Block, 2 degree Type 1  Ectopic Atrial Rhythm   PAC 0.09%, 0% PVC  0 button bresses    Last Stress (if available):    Last Cath (if available):    Last TTE/ISRAEL(if available) 9/2/22:  Study Conclusions     - Left ventricle: The cavity size is normal. Wall thickness is normal. Systolic function was normal.     The estimated ejection fraction was in the range of 50% to 55%. Although no diagnostic regional     wall motion abnormality was identified, this possibility cannot be completely excluded on the     basis of this study. Left ventricular diastolic function parameters were normal. There is no     evidence of a thrombus revealed by acoustic contrast opacification.   - Right ventricle: Systolic function was normal by visual assessment. - Atrial septum: A patent foramen ovale cannot be excluded. Agitated saline contrast study at     baseline or with provocation, shows no right-to-left atrial level shunt.   - Pulmonary arteries: Systolic pressure was estimated to be 24mm Hg + RAP. Last CMR  (if available):    Last Coronary Artery Calcium Score: Ankle-brachial index:    Carotid ultrasound screening:    Abdominal aortic aneurysm screening:    CXR: 1/22/23  Impression  1. No findings for acute cardiopulmonary disease. Assessment / Plan:     1. Palpitations    2. Snoring      No significant events on CAM  Still having some dizziness; patient declines neurology referral   patient was very upset today as her CPAP has still not been set up  I had the  call the sleep clinic and they spoke with her on the phone today to explain the process. No orders of the defined types were placed in this encounter. No follow-ups on file. The note was completed using EMR and Dragon dictation system. Every effort was made to ensure accuracy; however, inadvertent computerized transcription errors may be present.     All questions and concerns were addressed to the patient. I would like to thank you for providing me the opportunity to participate in the care of your patient. If you have any questions, please do not hesitate to contact me. Layla Zaman MD, [facility]  The [address] [place] [address] [place] [address]  Main Office Phone: [phone]  Fax: [phone]    I, Georgina Dobbins RN, am scribing for and in the presence of Dr. Layla Zaman. 03/09/23 3:00 PM  Georgina Dobbins RN    Physician Attestation:  The scribes documentation has been prepared under my direction and personally reviewed by me in its entirety. I confirm the note above accurately reflects all work, treatment, procedures, and medical decision making performed by me.     Electronically signed by Layla Zaman MD on 3/9/2023 at 3:00 PM

## 2023-03-06 ENCOUNTER — TELEPHONE (OUTPATIENT)
Dept: PULMONOLOGY | Age: 28
End: 2023-03-06

## 2023-03-06 DIAGNOSIS — G47.33 OBSTRUCTIVE SLEEP APNEA (ADULT) (PEDIATRIC): Primary | ICD-10-CM

## 2023-03-06 NOTE — TELEPHONE ENCOUNTER
Spoke with pt to review PSG. Pt states \" when I know something like this I will worry and worry until is is taken care of\". Explained to pt that the sleep lab will call her to schedule a titration study and she can ask for first available. Pt then stated her mom was on the other line and she would like me to explain to her \" what is going on\". Reviewed the results of the PSG with pt's mother. Pt to schedule a titration study.

## 2023-03-07 ENCOUNTER — TELEPHONE (OUTPATIENT)
Dept: PULMONOLOGY | Age: 28
End: 2023-03-07

## 2023-03-07 ENCOUNTER — OFFICE VISIT (OUTPATIENT)
Dept: CARDIOLOGY CLINIC | Age: 28
End: 2023-03-07
Payer: MEDICAID

## 2023-03-07 VITALS
WEIGHT: 293 LBS | BODY MASS INDEX: 47.09 KG/M2 | SYSTOLIC BLOOD PRESSURE: 128 MMHG | DIASTOLIC BLOOD PRESSURE: 78 MMHG | HEART RATE: 87 BPM | HEIGHT: 66 IN | OXYGEN SATURATION: 99 %

## 2023-03-07 DIAGNOSIS — R06.83 SNORING: ICD-10-CM

## 2023-03-07 DIAGNOSIS — R00.2 PALPITATIONS: Primary | ICD-10-CM

## 2023-03-07 PROCEDURE — 99213 OFFICE O/P EST LOW 20 MIN: CPT | Performed by: INTERNAL MEDICINE

## 2023-03-07 NOTE — TELEPHONE ENCOUNTER
Patient was at cardiology appointment and manager asked me to speak to patient due to  concerns about not having sleep equipment yet. Spoke with patient on the phone and explained that her sleep apnea will not cause her to completely stop breathing in her sleep. Explained that even though she was not sleeping well and was felling exhausted, that we could not prescribe anything for sleep until she completed her study, otherwise medications could complicate her sleep apnea.   Told patient I would speak to Sleep Lab and ask that she be put on a wait list.

## 2023-03-08 NOTE — TELEPHONE ENCOUNTER
Spoke with Sleep Lab , she has patient on a wait list and has an opening for her, just waiting to get approval through her insurance. Sleep Lab to call patient once approved.

## 2023-03-15 ENCOUNTER — HOSPITAL ENCOUNTER (INPATIENT)
Age: 28
LOS: 1 days | Discharge: HOME | DRG: 201 | End: 2023-03-16
Attending: EMERGENCY MEDICINE | Admitting: INTERNAL MEDICINE
Payer: MEDICAID

## 2023-03-15 ENCOUNTER — APPOINTMENT (OUTPATIENT)
Dept: GENERAL RADIOLOGY | Age: 28
DRG: 201 | End: 2023-03-15
Payer: MEDICAID

## 2023-03-15 DIAGNOSIS — I47.1 PAROXYSMAL SUPRAVENTRICULAR TACHYCARDIA (HCC): Primary | ICD-10-CM

## 2023-03-15 DIAGNOSIS — R77.8 ELEVATED TROPONIN: ICD-10-CM

## 2023-03-15 LAB
ANION GAP SERPL CALCULATED.3IONS-SCNC: 9 MMOL/L (ref 3–16)
BASOPHILS # BLD: 0.1 K/UL (ref 0–0.2)
BASOPHILS NFR BLD: 1.1 %
BUN SERPL-MCNC: 13 MG/DL (ref 7–20)
CALCIUM SERPL-MCNC: 9.2 MG/DL (ref 8.3–10.6)
CHLORIDE SERPL-SCNC: 104 MMOL/L (ref 99–110)
CO2 SERPL-SCNC: 27 MMOL/L (ref 21–32)
CREAT SERPL-MCNC: 0.8 MG/DL (ref 0.6–1.1)
DEPRECATED RDW RBC AUTO: 13.7 % (ref 12.4–15.4)
EKG ATRIAL RATE: 103 BPM
EKG DIAGNOSIS: NORMAL
EKG P AXIS: 45 DEGREES
EKG P-R INTERVAL: 200 MS
EKG Q-T INTERVAL: 332 MS
EKG QRS DURATION: 78 MS
EKG QTC CALCULATION (BAZETT): 434 MS
EKG R AXIS: 44 DEGREES
EKG T AXIS: 31 DEGREES
EKG VENTRICULAR RATE: 103 BPM
EOSINOPHIL # BLD: 0 K/UL (ref 0–0.6)
EOSINOPHIL NFR BLD: 0.7 %
GFR SERPLBLD CREATININE-BSD FMLA CKD-EPI: >60 ML/MIN/{1.73_M2}
GLUCOSE BLD-MCNC: 93 MG/DL (ref 70–99)
GLUCOSE SERPL-MCNC: 90 MG/DL (ref 70–99)
HCG SERPL QL: NEGATIVE
HCT VFR BLD AUTO: 41.8 % (ref 36–48)
HGB BLD-MCNC: 13.5 G/DL (ref 12–16)
LYMPHOCYTES # BLD: 1.6 K/UL (ref 1–5.1)
LYMPHOCYTES NFR BLD: 29.6 %
MCH RBC QN AUTO: 26.1 PG (ref 26–34)
MCHC RBC AUTO-ENTMCNC: 32.3 G/DL (ref 31–36)
MCV RBC AUTO: 80.7 FL (ref 80–100)
MONOCYTES # BLD: 0.7 K/UL (ref 0–1.3)
MONOCYTES NFR BLD: 12.4 %
NEUTROPHILS # BLD: 3 K/UL (ref 1.7–7.7)
NEUTROPHILS NFR BLD: 56.2 %
NT-PROBNP SERPL-MCNC: 63 PG/ML (ref 0–124)
PERFORMED ON: NORMAL
PLATELET # BLD AUTO: 307 K/UL (ref 135–450)
PMV BLD AUTO: 8 FL (ref 5–10.5)
POTASSIUM SERPL-SCNC: 3.6 MMOL/L (ref 3.5–5.1)
RBC # BLD AUTO: 5.19 M/UL (ref 4–5.2)
SODIUM SERPL-SCNC: 140 MMOL/L (ref 136–145)
TROPONIN T SERPL-MCNC: 0.02 NG/ML
TROPONIN T SERPL-MCNC: 0.02 NG/ML
TROPONIN T SERPL-MCNC: 0.06 NG/ML
WBC # BLD AUTO: 5.3 K/UL (ref 4–11)

## 2023-03-15 PROCEDURE — 93005 ELECTROCARDIOGRAM TRACING: CPT | Performed by: PHYSICIAN ASSISTANT

## 2023-03-15 PROCEDURE — 99285 EMERGENCY DEPT VISIT HI MDM: CPT

## 2023-03-15 PROCEDURE — 84484 ASSAY OF TROPONIN QUANT: CPT

## 2023-03-15 PROCEDURE — 84703 CHORIONIC GONADOTROPIN ASSAY: CPT

## 2023-03-15 PROCEDURE — 80048 BASIC METABOLIC PNL TOTAL CA: CPT

## 2023-03-15 PROCEDURE — 99223 1ST HOSP IP/OBS HIGH 75: CPT | Performed by: INTERNAL MEDICINE

## 2023-03-15 PROCEDURE — 36415 COLL VENOUS BLD VENIPUNCTURE: CPT

## 2023-03-15 PROCEDURE — 85025 COMPLETE CBC W/AUTO DIFF WBC: CPT

## 2023-03-15 PROCEDURE — 83880 ASSAY OF NATRIURETIC PEPTIDE: CPT

## 2023-03-15 PROCEDURE — 6360000002 HC RX W HCPCS: Performed by: INTERNAL MEDICINE

## 2023-03-15 PROCEDURE — 6370000000 HC RX 637 (ALT 250 FOR IP): Performed by: PHYSICIAN ASSISTANT

## 2023-03-15 PROCEDURE — 71045 X-RAY EXAM CHEST 1 VIEW: CPT

## 2023-03-15 PROCEDURE — 6370000000 HC RX 637 (ALT 250 FOR IP): Performed by: INTERNAL MEDICINE

## 2023-03-15 PROCEDURE — 1200000000 HC SEMI PRIVATE

## 2023-03-15 RX ORDER — ONDANSETRON 4 MG/1
4 TABLET, ORALLY DISINTEGRATING ORAL EVERY 8 HOURS PRN
Status: DISCONTINUED | OUTPATIENT
Start: 2023-03-15 | End: 2023-03-16 | Stop reason: HOSPADM

## 2023-03-15 RX ORDER — ENOXAPARIN SODIUM 100 MG/ML
40 INJECTION SUBCUTANEOUS 2 TIMES DAILY
Status: DISCONTINUED | OUTPATIENT
Start: 2023-03-15 | End: 2023-03-16 | Stop reason: HOSPADM

## 2023-03-15 RX ORDER — ASPIRIN 81 MG/1
81 TABLET, CHEWABLE ORAL DAILY
Status: DISCONTINUED | OUTPATIENT
Start: 2023-03-16 | End: 2023-03-16 | Stop reason: HOSPADM

## 2023-03-15 RX ORDER — ASPIRIN 81 MG/1
324 TABLET, CHEWABLE ORAL ONCE
Status: COMPLETED | OUTPATIENT
Start: 2023-03-15 | End: 2023-03-15

## 2023-03-15 RX ORDER — ONDANSETRON 2 MG/ML
4 INJECTION INTRAMUSCULAR; INTRAVENOUS EVERY 6 HOURS PRN
Status: DISCONTINUED | OUTPATIENT
Start: 2023-03-15 | End: 2023-03-16 | Stop reason: HOSPADM

## 2023-03-15 RX ORDER — INSULIN LISPRO 100 [IU]/ML
0-4 INJECTION, SOLUTION INTRAVENOUS; SUBCUTANEOUS NIGHTLY
Status: DISCONTINUED | OUTPATIENT
Start: 2023-03-15 | End: 2023-03-16 | Stop reason: HOSPADM

## 2023-03-15 RX ORDER — GLUCAGON 1 MG/ML
1 KIT INJECTION PRN
Status: DISCONTINUED | OUTPATIENT
Start: 2023-03-15 | End: 2023-03-16 | Stop reason: HOSPADM

## 2023-03-15 RX ORDER — DEXTROSE MONOHYDRATE 100 MG/ML
INJECTION, SOLUTION INTRAVENOUS CONTINUOUS PRN
Status: DISCONTINUED | OUTPATIENT
Start: 2023-03-15 | End: 2023-03-16 | Stop reason: HOSPADM

## 2023-03-15 RX ORDER — ACETAMINOPHEN 650 MG/1
650 SUPPOSITORY RECTAL EVERY 6 HOURS PRN
Status: DISCONTINUED | OUTPATIENT
Start: 2023-03-15 | End: 2023-03-16 | Stop reason: HOSPADM

## 2023-03-15 RX ORDER — SODIUM CHLORIDE 0.9 % (FLUSH) 0.9 %
5-40 SYRINGE (ML) INJECTION PRN
Status: DISCONTINUED | OUTPATIENT
Start: 2023-03-15 | End: 2023-03-16 | Stop reason: HOSPADM

## 2023-03-15 RX ORDER — INSULIN LISPRO 100 [IU]/ML
0-4 INJECTION, SOLUTION INTRAVENOUS; SUBCUTANEOUS
Status: DISCONTINUED | OUTPATIENT
Start: 2023-03-16 | End: 2023-03-16 | Stop reason: HOSPADM

## 2023-03-15 RX ORDER — ACETAMINOPHEN 325 MG/1
650 TABLET ORAL EVERY 6 HOURS PRN
Status: DISCONTINUED | OUTPATIENT
Start: 2023-03-15 | End: 2023-03-16 | Stop reason: HOSPADM

## 2023-03-15 RX ORDER — SODIUM CHLORIDE 9 MG/ML
INJECTION, SOLUTION INTRAVENOUS PRN
Status: DISCONTINUED | OUTPATIENT
Start: 2023-03-15 | End: 2023-03-16 | Stop reason: HOSPADM

## 2023-03-15 RX ORDER — POLYETHYLENE GLYCOL 3350 17 G/17G
17 POWDER, FOR SOLUTION ORAL DAILY PRN
Status: DISCONTINUED | OUTPATIENT
Start: 2023-03-15 | End: 2023-03-16 | Stop reason: HOSPADM

## 2023-03-15 RX ORDER — MORPHINE SULFATE 2 MG/ML
2 INJECTION, SOLUTION INTRAMUSCULAR; INTRAVENOUS
Status: DISCONTINUED | OUTPATIENT
Start: 2023-03-15 | End: 2023-03-15

## 2023-03-15 RX ORDER — SODIUM CHLORIDE 0.9 % (FLUSH) 0.9 %
5-40 SYRINGE (ML) INJECTION EVERY 12 HOURS SCHEDULED
Status: DISCONTINUED | OUTPATIENT
Start: 2023-03-15 | End: 2023-03-16 | Stop reason: HOSPADM

## 2023-03-15 RX ADMIN — ASPIRIN 324 MG: 81 TABLET, CHEWABLE ORAL at 18:45

## 2023-03-15 RX ADMIN — METOPROLOL TARTRATE 25 MG: 25 TABLET, FILM COATED ORAL at 15:42

## 2023-03-15 RX ADMIN — ENOXAPARIN SODIUM 40 MG: 100 INJECTION SUBCUTANEOUS at 22:51

## 2023-03-15 RX ADMIN — ACETAMINOPHEN 650 MG: 325 TABLET ORAL at 22:52

## 2023-03-15 ASSESSMENT — PAIN DESCRIPTION - FREQUENCY
FREQUENCY: CONTINUOUS
FREQUENCY: CONTINUOUS

## 2023-03-15 ASSESSMENT — PAIN DESCRIPTION - LOCATION
LOCATION: HEAD
LOCATION: HEAD

## 2023-03-15 ASSESSMENT — PAIN SCALES - GENERAL
PAINLEVEL_OUTOF10: 2
PAINLEVEL_OUTOF10: 3

## 2023-03-15 ASSESSMENT — PAIN DESCRIPTION - DESCRIPTORS
DESCRIPTORS: ACHING
DESCRIPTORS: ACHING

## 2023-03-15 ASSESSMENT — PAIN DESCRIPTION - ORIENTATION
ORIENTATION: MID
ORIENTATION: MID

## 2023-03-15 ASSESSMENT — ENCOUNTER SYMPTOMS
CHOKING: 0
BLOOD IN STOOL: 0
ANAL BLEEDING: 0
COUGH: 0
TROUBLE SWALLOWING: 0
ABDOMINAL DISTENTION: 0
EYE ITCHING: 0
EYE DISCHARGE: 0
CHEST TIGHTNESS: 0
DIARRHEA: 0
CONSTIPATION: 0
VOMITING: 0
SORE THROAT: 0
ABDOMINAL PAIN: 0
APNEA: 0
SHORTNESS OF BREATH: 0
BACK PAIN: 0
NAUSEA: 1
FACIAL SWELLING: 0

## 2023-03-15 ASSESSMENT — PAIN DESCRIPTION - ONSET
ONSET: ON-GOING
ONSET: ON-GOING

## 2023-03-15 ASSESSMENT — PAIN DESCRIPTION - PAIN TYPE
TYPE: ACUTE PAIN
TYPE: ACUTE PAIN

## 2023-03-15 ASSESSMENT — PAIN - FUNCTIONAL ASSESSMENT
PAIN_FUNCTIONAL_ASSESSMENT: ACTIVITIES ARE NOT PREVENTED
PAIN_FUNCTIONAL_ASSESSMENT: ACTIVITIES ARE NOT PREVENTED

## 2023-03-15 NOTE — ED NOTES
BREANNE Templeton, went in to discuss test results with the patient; she became tearful when discussing admission. I went in to reposition BP cuff and give ASA. Patient asked about the blood work. I asked her if the doctors who had seen her were explaining things thoroughly to her, as this is the third time she has asked me something that should have been discussed with the doctors who were in seeing her. I informed her I was happy to answer her questions, but also wanted to make sure that their communication was adequate. She states, \"I just want to go home\". I informed her that we cannot force her to stay and that she can sign out against medical advice, but that it may not be in her the best interest of her health. I then asked if there was anything else that she needed and she states, \"I just want you to leave me alone\". She later apologized and expressed that she is just very anxious and scared about what is possibly going on.      Lucia Grover RN  03/15/23 7748

## 2023-03-15 NOTE — DISCHARGE INSTRUCTIONS
Electrophysiology Study (EPS) and Catheter Ablation     Date of Procedure: March 31, 2023     Time of Arrival: 10:00 am     Cardiologist performing procedure: Dr. Theresa Robledo at [facility] through the main entrance. Check in at the Outpatient Diagnostic desk on the 1st floor. Do not eat or drink anything after midnight the night before the procedure. You may brush your teeth and rinse the morning of the procedure. Please hold metoprolol for 2 days prior to procedure. Take all your other routine medications the morning of the procedure with the following exceptions: If you are taking diabetic medications, please HOLD on the day of the procedure (including insulin). If you take Lantus insulin, take HALF of your usual dose the night before. Hold any other medications as instructed above. Lab work is due within a week of the procedure (on 3/27/23). You do not need to be fasting for these labs. This can be done at the [address] [place] [address] at [address]. Do not apply any lotion, powder, or deodorant the morning of the procedure. Please bring a list of your medications to the hospital with you. You must have someone available to drive you home. It is recommended that you do not drive for 48 hours after the procedure. You will need someone to stay with you at home the night of the procedure. If you are unable to make this appointment, please call Mari Tang at [phone].

## 2023-03-15 NOTE — ED PROVIDER NOTES
[address] [facility] ENCOUNTER          PHYSICIAN ASSISTANT NOTE     Date of evaluation: 3/15/2023    ADDENDUM:      Care of this patient was assumed from [place], [place]. The patient was seen for Tachycardia (Pt was in SVT; EMS gave 6 & 12 of adenosine which converted her to sinus. Rate now 107.)  . The patient's initial evaluation and plan have been discussed with the prior provider who initially evaluated the patient. Nursing Notes, Past Medical Hx, Past Surgical Hx, Social Hx, Allergies, and Family Hx were all reviewed. ASSESSMENT / PLAN  ([address])     Chelle Melo is a 32 y.o. female with PMH of SVT who presented with tachycardia. Patient developed dizziness, palpitations, and chest pain today. She was given adenosine en route by EMS with conversion of her heart rhythm back to sinus. She has remained in sinus tach to low 100's during her ED stay. She was given 25mg oral metoprolol. Laboratory studies remarkable for mild troponin leak of 0.02 (likely related to tachycardia). Repeat pending. CXR without acute findings. Cardiology was consulted and their recommendations are pending at time of shift change. Cardiology recommended starting metoprolol 25mg BID and will arrange for outpatient ablation on 3/31. However, after their recommendations, patient's 2nd troponin resulted at 0.06. Given this elevation, patient will be admitted to medicine for continued monitoring. She denies any active chest pain at this time - ASA given. Case discussed with hospitalist.    Is this patient to be included in the SEP-1 core measure due to severe sepsis or septic shock? No Exclusion criteria - the patient is NOT to be included for SEP-1 Core Measure due to:  Infection is not suspected    Medical Decision Making  Problems Addressed:  Elevated troponin: acute illness or injury  Paroxysmal supraventricular tachycardia ([facility]): acute illness or injury    Amount and/or Complexity of Data Reviewed  Labs: ordered. Decision-making details documented in ED Course. Radiology: ordered. Decision-making details documented in ED Course. ECG/medicine tests: ordered. Decision-making details documented in ED Course. Risk  OTC drugs. Prescription drug management. Decision regarding hospitalization. This patient was also evaluated by the attending physician. All care plans were discussed and agreed upon. Clinical Impression     1. Paroxysmal supraventricular tachycardia (HCC)    2. Elevated troponin        Disposition     PATIENT REFERRED TO:  [facility] Emergency Department  [address] [place] [address]  [phone]    If symptoms worsen    Elan Higginbotham MD  [address] [id number] [address]  [phone]    Schedule an appointment as soon as possible for a visit       DISCHARGE MEDICATIONS:  New Prescriptions    No medications on file       DISPOSITION Admitted 03/15/2023 07:27:05 PM        Diagnostic Results and Other Data     RADIOLOGY:  XR CHEST PORTABLE   Final Result      No acute cardiopulmonary findings.              LABS:   Results for orders placed or performed during the hospital encounter of 03/15/23   CBC with Auto Differential   Result Value Ref Range    WBC 5.3 4.0 - 11.0 K/uL    RBC 5.19 4.00 - 5.20 M/uL    Hemoglobin 13.5 12.0 - 16.0 g/dL    Hematocrit 41.8 36.0 - 48.0 %    MCV 80.7 80.0 - 100.0 fL    MCH 26.1 26.0 - 34.0 pg    MCHC 32.3 31.0 - 36.0 g/dL    RDW 13.7 12.4 - 15.4 %    Platelets 279 666 - 830 K/uL    MPV 8.0 5.0 - 10.5 fL    Neutrophils % 56.2 %    Lymphocytes % 29.6 %    Monocytes % 12.4 %    Eosinophils % 0.7 %    Basophils % 1.1 %    Neutrophils Absolute 3.0 1.7 - 7.7 K/uL    Lymphocytes Absolute 1.6 1.0 - 5.1 K/uL    Monocytes Absolute 0.7 0.0 - 1.3 K/uL    Eosinophils Absolute 0.0 0.0 - 0.6 K/uL    Basophils Absolute 0.1 0.0 - 0.2 K/uL   BMP w/ Reflex to MG   Result Value Ref Range    Sodium 140 136 - 145 mmol/L Potassium reflex Magnesium 3.6 3.5 - 5.1 mmol/L    Chloride 104 99 - 110 mmol/L    CO2 27 21 - 32 mmol/L    Anion Gap 9 3 - 16    Glucose 90 70 - 99 mg/dL    BUN 13 7 - 20 mg/dL    Creatinine 0.8 0.6 - 1.1 mg/dL    Est, Glom Filt Rate >60 >60    Calcium 9.2 8.3 - 10.6 mg/dL   Troponin   Result Value Ref Range    Troponin 0.02 (H) <0.01 ng/mL   BNP   Result Value Ref Range    Pro-BNP 63 0 - 124 pg/mL   HCG Qualitative, Serum   Result Value Ref Range    hCG Qual Negative Detects HCG level >10 MIU/mL   Troponin   Result Value Ref Range    Troponin 0.06 (H) <0.01 ng/mL   EKG 12 Lead   Result Value Ref Range    Ventricular Rate 103 BPM    Atrial Rate 103 BPM    P-R Interval 200 ms    QRS Duration 78 ms    Q-T Interval 332 ms    QTc Calculation (Bazett) 434 ms    P Axis 45 degrees    R Axis 44 degrees    T Axis 31 degrees    Diagnosis       EKG performed in ER and to be interpreted by ER physician. Confirmed by MD, ER ([id number]),  Vinay Hess ([id number]) on 3/15/2023 4:18:56 PM     EKG   Sinus tachycardia, , no acute ischemic changes    RECENT VITALS:  BP: 122/85, Temp: 98.3 °F (36.8 °C), Heart Rate: 77, Resp: 16, SpO2: 98 %     Procedures         ED Course          The patient was given the following medications:  Orders Placed This Encounter   Medications    metoprolol tartrate (LOPRESSOR) tablet 25 mg    aspirin chewable tablet 324 mg       CONSULTS:  IP CONSULT TO CARDIOLOGY  IP CONSULT TO HOSPITALIST  IP CONSULT TO Sarah Real PA-C  03/15/23 2020

## 2023-03-15 NOTE — H&P
Hospital Medicine History & Physical      PCP: [facility]    Date of Admission: 3/15/2023    Date of Service: Pt seen/examined on 3/15/2023 and Admitted to Inpatient with expected LOS greater than two midnights due to medical therapy. Chief Complaint: Symptomatic tachycardia/SVT      History Of Present Illness:    32 y.o. female who presents via EMS with complaints of heart palpitation associated with lightheadedness and chest pain. Patient has history of SVT. When EMS arrived, patient was found to be in SVT with heart rate in the 220s. EMS has administered adenosine x2 which is aborted the tachycardia. Currently patient is not symptomatic. Patient has morbid obesity with BMI of 55 and sleep apnea, awaiting CPAP. Patient was seen by cardiology for this and had a 7-day CAM which has not recorded any abnormal rhythms and monitor was removed after 7 days. According to the patient she has worn these monitor for 1 week in February    Past Medical History:          Diagnosis Date    Class 3 severe obesity due to excess calories without serious comorbidity with body mass index (BMI) of 50.0 to 59.9 in adult [facility]) 02/15/2023    Hypertension     Prediabetes     SVT (supraventricular tachycardia) ([facility] [address] 09/02/2022    TIA (transient ischemic attack)        Past Surgical History:      History reviewed. No pertinent surgical history. Medications Prior to Admission:      Prior to Admission medications    Medication Sig Start Date End Date Taking?  Authorizing Provider   Dulaglutide (TRULICITY SC) Inject into the skin   Yes Historical Provider, MD   aspirin 81 MG chewable tablet Take 1 tablet by mouth daily 2/8/23   Valery Pena MD   metoprolol tartrate (LOPRESSOR) 25 MG tablet Take 1 tablet by mouth 2 times daily 1/22/23   Hal Nobles MD       Allergies:  Dilaudid [hydromorphone hcl]    Social History:      The patient currently lives at home    TOBACCO:   reports that she quit smoking about 2 years ago. Her smoking use included cigarettes. She has a 0.40 pack-year smoking history. She has never used smokeless tobacco.  ETOH:   reports no history of alcohol use. E-cigarette/Vaping       Questions Responses    E-cigarette/Vaping Use     Start Date     Passive Exposure     Quit Date     Counseling Given     Comments               Family History:    Reviewed and negative in regards to presenting illness/complaint. Problem Relation Age of Onset    Sleep Apnea Brother        REVIEW OF SYSTEMS COMPLETED:   Pertinent positives as noted in the HPI. All other systems reviewed and negative. PHYSICAL EXAM PERFORMED:    /85   Pulse 77   Temp 98.3 °F (36.8 °C) (Oral)   Resp 16   Wt (!) 337 lb 12.8 oz (153.2 kg)   LMP 02/08/2023   SpO2 98%   BMI 54.52 kg/m²     General appearance:  No apparent distress, appears stated age and cooperative. Morbidly obese  HEENT:  Normal cephalic, atraumatic without obvious deformity. Pupils equal, round, and reactive to light. Extra ocular muscles intact. Conjunctivae/corneas clear. Neck: Supple, with full range of motion. No jugular venous distention. Trachea midline. Respiratory:  Normal respiratory effort. Clear to auscultation, bilaterally without Rales/Wheezes/Rhonchi. Cardiovascular:  Regular rate and rhythm with normal S1/S2 without murmurs, rubs or gallops. Abdomen: Soft, non-tender, non-distended with normal bowel sounds. Musculoskeletal:  No clubbing, cyanosis or edema bilaterally. Full range of motion without deformity. Skin: Skin color, texture, turgor normal.  No rashes or lesions. Neurologic:  Neurovascularly intact without any focal sensory/motor deficits.  Cranial nerves: II-XII intact, grossly non-focal.  Psychiatric:  Alert and oriented, thought content appropriate, normal insight  Capillary Refill: Brisk,3 seconds, normal  Peripheral Pulses: +2 palpable, equal bilaterally       Labs:     Recent Labs     03/15/23  1549   WBC 5.3   HGB 13.5 HCT 41.8        Recent Labs     03/15/23  1549      K 3.6      CO2 27   BUN 13   CREATININE 0.8   CALCIUM 9.2     No results for input(s): AST, ALT, BILIDIR, BILITOT, ALKPHOS in the last 72 hours. No results for input(s): INR in the last 72 hours. Recent Labs     03/15/23  1549 03/15/23  1726   TROPONINI 0.02* 0.06*       Urinalysis:      Lab Results   Component Value Date/Time    NITRU Negative 09/06/2019 08:37 PM    WBCUA 3-5 09/06/2019 08:37 PM    BACTERIA 2+ 09/06/2019 08:37 PM    RBCUA 0-2 09/06/2019 08:37 PM    BLOODU Negative 09/06/2019 08:37 PM    SPECGRAV >=1.030 09/06/2019 08:37 PM    GLUCOSEU Negative 09/06/2019 08:37 PM       Radiology:     CXR: I have reviewed the CXR with the following interpretation: No acute cardiopulmonary disease  EKG:  I have reviewed the EKG with the following interpretation: Sinus tachycardia, VR = 103, normal intervals, no acute ST-T changes    XR CHEST PORTABLE   Final Result      No acute cardiopulmonary findings. Consults:    IP CONSULT TO CARDIOLOGY  IP CONSULT TO HOSPITALIST  IP CONSULT TO CARDIOLOGY    ASSESSMENT:    Active Hospital Problems    Diagnosis Date Noted    SVT (supraventricular tachycardia) ([facility] [address] [I47.1] 09/02/2022     Priority: Medium   #Symptomatic SVT  History of recurrent SVT  #Elevated troponin-trending up, likely secondary to tachycardia. Patient currently asymptomatic with controlled heart rate  #Essential hypertension  #History of prediabetes, started on Trulicity a week ago  #Morbid obesity with BMI of 55  #Obstructive sleep apnea    PLAN:  -Trend cardiac enzymes  -Patient was given aspirin 324 mg in ED  -Will continue home doses of aspirin  -Continue on metoprolol 25 mg twice daily  -Patient seen by cardiology already  -Check A1c, continue on current insulin regimen with hypoglycemia protocol  -Monitor electrolytes and replace accordingly  -Supportive therapy    DVT Prophylaxis: Lovenox  Diet: ADULT DIET;  Regular; 3 carb choices (45 gm/meal); Low Fat/Low Chol/High Fiber/2 gm Na; No Caffeine  Diet NPO  Code Status: Full Code    PT/OT Eval Status: As tolerated    Dispo -GMF with telemetry       Anat Storm MD    Thank you [facility] for the opportunity to be involved in this patient's care. If you have any questions or concerns please feel free to contact me at [phone].

## 2023-03-15 NOTE — H&P (VIEW-ONLY)
last 72 hours. Troponin:   Recent Labs     03/15/23  1549 03/15/23  1726   TROPONINI 0.02* 0.06*     BNP: No results for input(s): BNP in the last 72 hours. Lipids: No results for input(s): CHOL, HDL in the last 72 hours. Invalid input(s): LDLCALCU, TRIGLYCERIDE  ABGs:  No results for input(s): PHART, WUK7NOL, PO2ART, YBW5JWV, BEART, THGBART, X0YCGBJO, NJH4ETF in the last 72 hours. INR: No results for input(s): INR in the last 72 hours. Lactate: No results for input(s): LACTATE in the last 72 hours. Cultures:  -----------------------------------------------------------------  RAD:   XR CHEST PORTABLE   Final Result      No acute cardiopulmonary findings.              Assessment:       SVT  - EKG and presentation consistent with SVT  - Discussed different options with the patient and she agreed to moving forward with an EP study with possible ablation which we will schedule on March 31st around 2 PM.  - The patient will get instructions from the EP office on further testing prior to the procedure  -ok to discharge home from EP stand point with metoprolol 25 mg BID    This patient will be discussed with attending, Dr. Marco A Posada MD PGY-3

## 2023-03-15 NOTE — ED PROVIDER NOTES
ED Attending Attestation Note     Date of evaluation: 3/15/2023    This patient was seen by the advance practice provider. I have seen and examined the patient, agree with the workup, evaluation, management and diagnosis. The care plan has been discussed. I have reviewed the ECG and concur with the ABDIEL's interpretation. My assessment reveals a 59-year-old female with past medical history of supraventricular tachycardia presenting to the emergency department with an episode of supraventricular tachycardia. Her episodes usually resolve with vagal maneuvers, this time did not require administration of 2 doses of adenosine by EMS. She did endorse some substernal chest pain after the onset of her tachycardia with resolution after return to normal sinus rhythm. On exam she is afebrile, hemodynamically stable although slightly tachycardic in the low 100s, sinus rhythm on monitor and twelve-lead EKG, no respiratory distress on room air. General: Well developed and well nourished. No acute distress. HEENT: NCAT, anicteric, no conjunctival injection, no eye/nose/ear discharge  Neck: Trachea midline, neck supple with FROM  Heart: Regular rate and rhythm, extremities well-perfused. Lungs: Normal effort, comfortable on room air. Abd: Nondistended, no signs of trauma. MSK: No obvious deformities. Range of motion grossly intact. Extremities: No cyanosis or edema. Skin: No rashes, abrasions, contusions, or lacerations noted. Neuro: Alert and oriented, moves all extremities spontaneously. No gross motor or sensory deficits. Psych: Mood and affect appropriate. Thought process and content normal.    Our plan will be to obtain labs, chest x-ray, also discuss metoprolol dosing and follow-up with her outpatient cardiologist.  Disposition is pending results of work-up.       Francisca Ross MD  [address], [place]  03/16/23 [id number]

## 2023-03-15 NOTE — ED PROVIDER NOTES
THE [facility]  EMERGENCY DEPARTMENT ENCOUNTER          PHYSICIAN ASSISTANT NOTE       Date of evaluation: 3/15/2023    Chief Complaint     Tachycardia (Pt was in SVT; EMS gave 6 & 12 of adenosine which converted her to sinus. Rate now 107.)      History of Present Illness     Paras Javier is a 27 y.o. female who presents with tachycardia.  Patient has a history of SVT and is under the care of a cardiologist.  She states today she started feeling lightheaded and dizzy and heart palpitations.  The [employer] arrived and stated her pulse was in the 200s.  She was given 2 doses of adenosine and is now back to sinus rhythm.  She states she does feel somewhat lightheaded still.  Denies syncope.  Denies chest pain or shortness of breath.  Denies any recent illness, nausea, vomiting or diarrhea.  Denies any abdominal pain or back pain.  Patient is concerned she missed her period last month and would like a pregnancy test.    ASSESSMENT / PLAN  (MEDICAL DECISION MAKING)     INITIAL VITALS: BP: 125/86, Temp: 98.3 °F (36.8 °C), Heart Rate: (!) 106, Resp: 20, SpO2: 100 %    Paras Javier is a 27 y.o. female with a history of SVT presents after having episode of SVT at home today.  She was given 2 doses of adenosine 6 mg then 12 mg by [employer] prior to arrival.  When she arrives here she is in sinus tachycardia with a heart rate 103.  She was recently seen by cardiology  on March 9.  She is on metoprolol as needed and did do a heart monitor which did not show any SVT episodes.  She was given her dose of metoprolol here today.  I did consult cardiology and awaiting their recommendations.  Patient's troponin was 0.02 so we will repeat this in 90 minutes.  proBNP is 63.  BMP is within normal notes creatinine 0.8 and glucose of 90.  Pregnancy test negative.  CBC was within normal limits with a white count of 5.3 and hemoglobin 13.5.  EKG shows a sinus tachycardia with a heart rate of 103.  Chest x-ray was  negative for acute disease. At this point this patient be turned over to the [employer] ABDIEL to follow-up on final cardiology recommendations and repeat troponin. Is this patient to be included in the SEP-1 core measure due to severe sepsis or septic shock? No Exclusion criteria - the patient is NOT to be included for SEP-1 Core Measure due to: Infection is not suspected    Medical Decision Making  Amount and/or Complexity of Data Reviewed  Labs: ordered. Radiology: ordered. ECG/medicine tests: ordered. Risk  Prescription drug management. This patient was also evaluated by the attending physician. All care plans were discussed and agreed upon. Clinical Impression     1. Paroxysmal supraventricular tachycardia (HCC)        Disposition     PATIENT REFERRED TO:  No follow-up provider specified. DISCHARGE MEDICATIONS:  New Prescriptions    No medications on file       DISPOSITION  pending cardiology and repeat troponin        Diagnostic Results and Other Data     RADIOLOGY:  XR CHEST PORTABLE   Final Result      No acute cardiopulmonary findings.              LABS:   Results for orders placed or performed during the hospital encounter of 03/15/23   CBC with Auto Differential   Result Value Ref Range    WBC 5.3 4.0 - 11.0 K/uL    RBC 5.19 4.00 - 5.20 M/uL    Hemoglobin 13.5 12.0 - 16.0 g/dL    Hematocrit 41.8 36.0 - 48.0 %    MCV 80.7 80.0 - 100.0 fL    MCH 26.1 26.0 - 34.0 pg    MCHC 32.3 31.0 - 36.0 g/dL    RDW 13.7 12.4 - 15.4 %    Platelets 295 375 - 712 K/uL    MPV 8.0 5.0 - 10.5 fL    Neutrophils % 56.2 %    Lymphocytes % 29.6 %    Monocytes % 12.4 %    Eosinophils % 0.7 %    Basophils % 1.1 %    Neutrophils Absolute 3.0 1.7 - 7.7 K/uL    Lymphocytes Absolute 1.6 1.0 - 5.1 K/uL    Monocytes Absolute 0.7 0.0 - 1.3 K/uL    Eosinophils Absolute 0.0 0.0 - 0.6 K/uL    Basophils Absolute 0.1 0.0 - 0.2 K/uL   BMP w/ Reflex to MG   Result Value Ref Range    Sodium 140 136 - 145 mmol/L    Potassium reflex Magnesium 3.6 3.5 - 5.1 mmol/L    Chloride 104 99 - 110 mmol/L    CO2 27 21 - 32 mmol/L    Anion Gap 9 3 - 16    Glucose 90 70 - 99 mg/dL    BUN 13 7 - 20 mg/dL    Creatinine 0.8 0.6 - 1.1 mg/dL    Est, Glom Filt Rate >60 >60    Calcium 9.2 8.3 - 10.6 mg/dL   Troponin   Result Value Ref Range    Troponin 0.02 (H) <0.01 ng/mL   BNP   Result Value Ref Range    Pro-BNP 63 0 - 124 pg/mL   HCG Qualitative, Serum   Result Value Ref Range    hCG Qual Negative Detects HCG level >10 MIU/mL   EKG 12 Lead   Result Value Ref Range    Ventricular Rate 103 BPM    Atrial Rate 103 BPM    P-R Interval 200 ms    QRS Duration 78 ms    Q-T Interval 332 ms    QTc Calculation (Bazett) 434 ms    P Axis 45 degrees    R Axis 44 degrees    T Axis 31 degrees    Diagnosis       EKG performed in ER and to be interpreted by ER physician. Confirmed by MD, ER ([id number]),  Colleen Ballard ([id number]) on 3/15/2023 4:18:56 PM     EKG   Interpreted in conjunction with emergency department physician Arlette Rojo MD  EKG Interpretation  Rhythm: Sinus tachycardia  Rate: Tachycardic heart rate 103  Axis: normal  Ectopy: none  Conduction: normal  ST Segments: no acute change  T Waves: no acute change  Q Waves: none    Clinical Impression: Sinus tachycardia    RECENT VITALS:  BP: 123/89, Temp: 98.3 °F (36.8 °C), Heart Rate: (!) 107, Resp: 20, SpO2: 100 %     Procedures       ED Course     Nursing Notes, Past Medical Hx,Past Surgical Hx, Social Hx, Allergies, and Family Hx were reviewed. The patient was given the following medications:  Orders Placed This Encounter   Medications    metoprolol tartrate (LOPRESSOR) tablet 25 mg       CONSULTS:  IP CONSULT TO CARDIOLOGY    Review of Systems     Review of Systems   Constitutional:  Negative for chills, fatigue and fever. HENT:  Negative for congestion, facial swelling, postnasal drip, sore throat and trouble swallowing. Eyes:  Negative for visual disturbance.    Respiratory:  Negative for cough, chest tightness and shortness of breath. Cardiovascular:  Positive for palpitations. Negative for chest pain and leg swelling. Gastrointestinal:  Positive for nausea. Negative for abdominal pain, diarrhea and vomiting. Genitourinary:  Negative for flank pain, pelvic pain and vaginal bleeding. Musculoskeletal:  Negative for back pain, neck pain and neck stiffness. Skin:  Negative for rash. Neurological:  Positive for weakness and light-headedness. Negative for dizziness, syncope, numbness and headaches. Psychiatric/Behavioral:  Negative for confusion. All other systems reviewed and are negative. Past Medical, Surgical, Family, and Social History     She has a past medical history of Class 3 severe obesity due to excess calories without serious comorbidity with body mass index (BMI) of 50.0 to 59.9 in [facility]), Hypertension, Prediabetes, SVT (supraventricular tachycardia) ([facility] [address] and TIA (transient ischemic attack). She has no past surgical history on file. Her family history includes Sleep Apnea in her brother. She reports that she quit smoking about 2 years ago. Her smoking use included cigarettes. She has a 0.40 pack-year smoking history. She has never used smokeless tobacco. She reports that she does not drink alcohol and does not use drugs. Medications     Previous Medications    ASPIRIN 81 MG CHEWABLE TABLET    Take 1 tablet by mouth daily    DULAGLUTIDE (TRULICITY SC)    Inject into the skin    METOPROLOL TARTRATE (LOPRESSOR) 25 MG TABLET    Take 1 tablet by mouth 2 times daily       Allergies     She is allergic to dilaudid [hydromorphone hcl]. Physical Exam     INITIAL VITALS: BP: 125/86, Temp: 98.3 °F (36.8 °C), Heart Rate: (!) 106, Resp: 20, SpO2: 100 %  Physical Exam  Vitals and nursing note reviewed. Constitutional:       Appearance: She is obese. HENT:      Head: Normocephalic and atraumatic.       Right Ear: External ear normal.      Left Ear: External ear normal.      Nose: Nose normal.      Mouth/Throat:      Pharynx: Oropharynx is clear. Eyes:      Conjunctiva/sclera: Conjunctivae normal.      Pupils: Pupils are equal, round, and reactive to light. Cardiovascular:      Rate and Rhythm: Regular rhythm. Tachycardia present. Pulses: Normal pulses. Heart sounds: Normal heart sounds. Pulmonary:      Effort: Pulmonary effort is normal.      Breath sounds: Normal breath sounds. Chest:      Chest wall: No tenderness. Abdominal:      General: Abdomen is flat. There is no distension. Tenderness: There is no abdominal tenderness. There is no right CVA tenderness, left CVA tenderness, guarding or rebound. Musculoskeletal:         General: Normal range of motion. Cervical back: Normal range of motion and neck supple. Right lower leg: No edema. Left lower leg: No edema. Skin:     General: Skin is warm and dry. Neurological:      General: No focal deficit present. Mental Status: She is alert and oriented to person, place, and time. Cranial Nerves: No cranial nerve deficit. Sensory: No sensory deficit. Motor: No weakness.           [place], [place]  03/15/23 [id number]

## 2023-03-16 ENCOUNTER — PREP FOR PROCEDURE (OUTPATIENT)
Dept: CARDIOLOGY CLINIC | Age: 28
End: 2023-03-16

## 2023-03-16 ENCOUNTER — APPOINTMENT (OUTPATIENT)
Dept: CT IMAGING | Age: 28
DRG: 201 | End: 2023-03-16
Payer: MEDICAID

## 2023-03-16 VITALS
HEART RATE: 68 BPM | DIASTOLIC BLOOD PRESSURE: 68 MMHG | WEIGHT: 293 LBS | OXYGEN SATURATION: 98 % | BODY MASS INDEX: 47.09 KG/M2 | HEIGHT: 66 IN | SYSTOLIC BLOOD PRESSURE: 122 MMHG | TEMPERATURE: 98 F | RESPIRATION RATE: 20 BRPM

## 2023-03-16 LAB
EKG ATRIAL RATE: 68 BPM
EKG ATRIAL RATE: 81 BPM
EKG DIAGNOSIS: NORMAL
EKG DIAGNOSIS: NORMAL
EKG P AXIS: 21 DEGREES
EKG P AXIS: 41 DEGREES
EKG P-R INTERVAL: 188 MS
EKG P-R INTERVAL: 198 MS
EKG Q-T INTERVAL: 354 MS
EKG Q-T INTERVAL: 402 MS
EKG QRS DURATION: 80 MS
EKG QRS DURATION: 86 MS
EKG QTC CALCULATION (BAZETT): 411 MS
EKG QTC CALCULATION (BAZETT): 427 MS
EKG R AXIS: 32 DEGREES
EKG R AXIS: 49 DEGREES
EKG T AXIS: 10 DEGREES
EKG T AXIS: 15 DEGREES
EKG VENTRICULAR RATE: 68 BPM
EKG VENTRICULAR RATE: 81 BPM
GLUCOSE BLD-MCNC: 110 MG/DL (ref 70–99)
GLUCOSE BLD-MCNC: 79 MG/DL (ref 70–99)
GLUCOSE BLD-MCNC: 93 MG/DL (ref 70–99)
LV EF: 58 %
LVEF MODALITY: NORMAL
PERFORMED ON: ABNORMAL
PERFORMED ON: NORMAL
PERFORMED ON: NORMAL
TROPONIN T SERPL-MCNC: <0.01 NG/ML

## 2023-03-16 PROCEDURE — 36415 COLL VENOUS BLD VENIPUNCTURE: CPT

## 2023-03-16 PROCEDURE — 2500000003 HC RX 250 WO HCPCS: Performed by: RADIOLOGY

## 2023-03-16 PROCEDURE — 6370000000 HC RX 637 (ALT 250 FOR IP): Performed by: INTERNAL MEDICINE

## 2023-03-16 PROCEDURE — C8929 TTE W OR WO FOL WCON,DOPPLER: HCPCS

## 2023-03-16 PROCEDURE — 93010 ELECTROCARDIOGRAM REPORT: CPT | Performed by: INTERNAL MEDICINE

## 2023-03-16 PROCEDURE — 93005 ELECTROCARDIOGRAM TRACING: CPT | Performed by: INTERNAL MEDICINE

## 2023-03-16 PROCEDURE — 84484 ASSAY OF TROPONIN QUANT: CPT

## 2023-03-16 PROCEDURE — 6360000004 HC RX CONTRAST MEDICATION: Performed by: INTERNAL MEDICINE

## 2023-03-16 PROCEDURE — 2580000003 HC RX 258: Performed by: INTERNAL MEDICINE

## 2023-03-16 PROCEDURE — 99233 SBSQ HOSP IP/OBS HIGH 50: CPT | Performed by: INTERNAL MEDICINE

## 2023-03-16 PROCEDURE — 99223 1ST HOSP IP/OBS HIGH 75: CPT | Performed by: INTERNAL MEDICINE

## 2023-03-16 PROCEDURE — 6360000002 HC RX W HCPCS: Performed by: INTERNAL MEDICINE

## 2023-03-16 PROCEDURE — 75574 CT ANGIO HRT W/3D IMAGE: CPT

## 2023-03-16 RX ORDER — METOPROLOL TARTRATE 5 MG/5ML
5 INJECTION INTRAVENOUS EVERY 5 MIN PRN
Status: DISCONTINUED | OUTPATIENT
Start: 2023-03-16 | End: 2023-03-16 | Stop reason: HOSPADM

## 2023-03-16 RX ORDER — METOPROLOL TARTRATE 50 MG/1
50 TABLET, FILM COATED ORAL 2 TIMES DAILY
Status: COMPLETED | OUTPATIENT
Start: 2023-03-16 | End: 2023-03-16

## 2023-03-16 RX ORDER — HYDROXYZINE HYDROCHLORIDE 25 MG/1
25 TABLET, FILM COATED ORAL ONCE
Status: DISCONTINUED | OUTPATIENT
Start: 2023-03-16 | End: 2023-03-16 | Stop reason: HOSPADM

## 2023-03-16 RX ORDER — NITROGLYCERIN 0.4 MG/1
0.4 TABLET SUBLINGUAL ONCE
Status: DISCONTINUED | OUTPATIENT
Start: 2023-03-16 | End: 2023-03-16 | Stop reason: HOSPADM

## 2023-03-16 RX ADMIN — PERFLUTREN 1.5 ML: 6.52 INJECTION, SUSPENSION INTRAVENOUS at 10:52

## 2023-03-16 RX ADMIN — METOPROLOL TARTRATE 50 MG: 50 TABLET, FILM COATED ORAL at 13:00

## 2023-03-16 RX ADMIN — SODIUM CHLORIDE, PRESERVATIVE FREE 10 ML: 5 INJECTION INTRAVENOUS at 01:25

## 2023-03-16 RX ADMIN — SODIUM CHLORIDE, PRESERVATIVE FREE 10 ML: 5 INJECTION INTRAVENOUS at 08:43

## 2023-03-16 RX ADMIN — METOPROLOL TARTRATE 25 MG: 25 TABLET, FILM COATED ORAL at 08:43

## 2023-03-16 RX ADMIN — METOPROLOL TARTRATE 5 MG: 1 INJECTION, SOLUTION INTRAVENOUS at 15:34

## 2023-03-16 RX ADMIN — IOPAMIDOL 75 ML: 755 INJECTION, SOLUTION INTRAVENOUS at 15:55

## 2023-03-16 RX ADMIN — ASPIRIN 81 MG 81 MG: 81 TABLET ORAL at 08:43

## 2023-03-16 RX ADMIN — ACETAMINOPHEN 650 MG: 325 TABLET ORAL at 12:57

## 2023-03-16 RX ADMIN — ENOXAPARIN SODIUM 40 MG: 100 INJECTION SUBCUTANEOUS at 08:43

## 2023-03-16 ASSESSMENT — PAIN DESCRIPTION - LOCATION: LOCATION: HEAD

## 2023-03-16 ASSESSMENT — PAIN DESCRIPTION - DESCRIPTORS: DESCRIPTORS: ACHING

## 2023-03-16 ASSESSMENT — PAIN DESCRIPTION - ORIENTATION: ORIENTATION: ANTERIOR;MID

## 2023-03-16 ASSESSMENT — PAIN SCALES - GENERAL: PAINLEVEL_OUTOF10: 3

## 2023-03-16 NOTE — PROGRESS NOTES
Pharmacist Review and Automatic Dose Adjustment of Prophylactic Enoxaparin    The reviewing pharmacist has made an adjustment to the ordered enoxaparin dose or converted to UFH per the approved [facility] protocol and table as defined below. Plan / Rationale: Based upon the patient's weight and renal function, the ordered dose of 40 mg QDAY has been converted to 40 mg BID. Thank you,  Antonio Figueroa, [facility]  3/15/2023, 10:01 PM      Paras Felix Dodd is a 32 y.o. female. Recent Labs     03/15/23  1549   CREATININE 0.8       Estimated Creatinine Clearance: 162 mL/min (based on SCr of 0.8 mg/dL). Recent Labs     03/15/23  1549   HGB 13.5   HCT 41.8        No results for input(s): INR in the last 72 hours.     Height:   Ht Readings from Last 1 Encounters:   03/07/23 5' 6\" (1.676 m)     Weight:  Wt Readings from Last 1 Encounters:   03/15/23 (!) 337 lb 12.8 oz (153.2 kg)

## 2023-03-16 NOTE — PROGRESS NOTES
Progress Note  Admit Date: 3/15/2023       Overnight Events: none noted. CC: F/U for SVT, elevated trops  Interval History: Pt states she has HA, but otherwise feels ok. Currently sinus on tele. Plan for CT today to fede for CAD. If unremarkable will dc home later. hydrOXYzine HCl  25 mg Oral Once    metoprolol tartrate  50 mg Oral BID    aspirin  81 mg Oral Daily    sodium chloride flush  5-40 mL IntraVENous 2 times per day    insulin lispro  0-4 Units SubCUTAneous TID WC    insulin lispro  0-4 Units SubCUTAneous Nightly    enoxaparin  40 mg SubCUTAneous BID      PRN Medications: glucose, dextrose bolus **OR** dextrose bolus, glucagon (rDNA), dextrose, sodium chloride flush, sodium chloride, ondansetron **OR** ondansetron, acetaminophen **OR** acetaminophen, polyethylene glycol  Diet: ADULT DIET; Regular; 3 carb choices (45 gm/meal); Low Fat/Low Chol/High Fiber/JOSE R  Continuous Infusions:   dextrose      sodium chloride       PHYSICAL EXAM:  /75   Pulse 71   Temp 97.8 °F (36.6 °C) (Oral)   Resp 18   Ht 5' 6\" (1.676 m)   Wt (!) 330 lb 11 oz (150 kg)   LMP 02/08/2023   SpO2 98%   BMI 53.37 kg/m²   Recent Labs     03/15/23  2241 03/16/23  0744 03/16/23  1053   POCGLU 93 110* 79       Intake/Output Summary (Last 24 hours) at 3/16/2023 1325  Last data filed at 3/15/2023 2220  Gross per 24 hour   Intake 240 ml   Output 700 ml   Net -460 ml       General appearance:  No apparent distress, appears stated age and cooperative. Morbidly obese  HEENT:  Normal cephalic, atraumatic without obvious deformity. Pupils equal, round, and reactive to light. Extra ocular muscles intact. Conjunctivae/corneas clear. Neck: Supple, with full range of motion. No jugular venous distention. Trachea midline. Respiratory:  Normal respiratory effort. Clear to auscultation, bilaterally without Rales/Wheezes/Rhonchi.   Cardiovascular:  Regular rate and rhythm with normal S1/S2 without murmurs, rubs or gallops. Abdomen: Soft, non-tender, non-distended with normal bowel sounds. Musculoskeletal:  No clubbing, cyanosis or edema bilaterally. Full range of motion without deformity. Skin: Skin color, texture, turgor normal.  No rashes or lesions. Neurologic:  Neurovascularly intact without any focal sensory/motor deficits. Cranial nerves: II-XII intact, grossly non-focal.  Psychiatric:  Alert and oriented, thought content appropriate, normal insight  Capillary Refill: Brisk,3 seconds, normal  Peripheral Pulses: +2 palpable, equal bilaterally       LABS:  Recent Labs     03/15/23  1549   WBC 5.3   HGB 13.5   HCT 41.8                                                                       Recent Labs     03/15/23  1549      K 3.6      CO2 27   BUN 13   CREATININE 0.8   GLUCOSE 90     No results for input(s): AST, ALT, ALB, BILITOT, ALKPHOS in the last 72 hours. Recent Labs     03/15/23  1726 03/15/23  2312 03/16/23  0430   TROPONINI 0.06* 0.02* <0.01     Assessment & Plan:    Patient Active Problem List:    #Symptomatic SVT - per EP:     \"No recurrence of SVT over the night  Echocardiogram today  If echo is normal, then okay to discharge from cardiology standpoint  She is scheduled for SVT ablation on March 31st  I have placed her procedural instructions under \" discharge instructions\"  She will also receive [employer] message with the procedural instructions\"  #Elevated troponin-trending up, likely secondary to tachycardia. Patient currently asymptomatic with controlled heart rate - per Dr. Britany Pittman order has been placed for CT to eval for CAD. #Essential hypertension    #History of prediabetes, started on Trulicity a week ago    #Morbid obesity with BMI of 55    #Obstructive sleep apnea    If CT unremarkable, can be dc'd later today.              SVT (supraventricular tachycardia) (HCC)     Class 3 severe obesity due to excess calories without serious comorbidity with body mass index (BMI) of 50.0 to 59.9 in [facility])        The patient and / or the family were informed of the results of any tests, a time was given to answer questions, a plan was proposed and they agreed with plan. Disposition: possible dc home pending CT results.      Full Code

## 2023-03-16 NOTE — PLAN OF CARE
Problem: Discharge Planning  Goal: Discharge to home or other facility with appropriate resources  3/16/2023 0409 by Ramin Gaitan RN  Outcome: Progressing  Flowsheets (Taken 3/16/2023 0409)  Discharge to home or other facility with appropriate resources: Identify barriers to discharge with patient and caregiver     Problem: Pain  Goal: Verbalizes/displays adequate comfort level or baseline comfort level  3/16/2023 0409 by Ramin Gaitan RN  Outcome: Progressing  Flowsheets (Taken 3/16/2023 0409)  Verbalizes/displays adequate comfort level or baseline comfort level:   Encourage patient to monitor pain and request assistance   Assess pain using appropriate pain scale   Administer analgesics based on type and severity of pain and evaluate response   Implement non-pharmacological measures as appropriate and evaluate response   Consider cultural and social influences on pain and pain management     Problem: Safety - Adult  Goal: Free from fall injury  Outcome: Progressing  Flowsheets (Taken 3/16/2023 0409)  Free From Fall Injury: Instruct family/caregiver on patient safety  Note: Bed in lowest position with alarm on and call light within reach

## 2023-03-16 NOTE — PLAN OF CARE
Problem: Pain  Goal: Verbalizes/displays adequate comfort level or baseline comfort level  3/16/2023 0923 by Carito Malloy RN  Outcome: Progressing  Flowsheets (Taken 3/16/2023 0494)  Verbalizes/displays adequate comfort level or baseline comfort level:   Encourage patient to monitor pain and request assistance   Administer analgesics based on type and severity of pain and evaluate response   Assess pain using appropriate pain scale  Note: Patient denied pain and appeared comfortable at morning rounds. Will continue to monitor. Outcome: Progressing  Problem: Safety - Adult  Goal: Free from fall injury  3/16/2023 0923 by Carito Malloy RN  Outcome: Progressing  Flowsheets (Taken 3/16/2023 8676)  Free From Fall Injury:   Instruct family/caregiver on patient safety   Based on caregiver fall risk screen, instruct family/caregiver to ask for assistance with transferring infant if caregiver noted to have fall risk factors  Note: Patient is alert, oriented and follows commands. She ambulates in room independently and with a steady gait. Standard safety precautions are in place. Will continue to monitor for safety.

## 2023-03-16 NOTE — DISCHARGE INSTR - DIET
Good nutrition is important when healing from an illness, injury, or surgery. Follow any nutrition recommendations given to you during your hospital stay. If you were given an oral nutrition supplement while in the hospital, continue to take this supplement at home. You can take it with meals, in-between meals, and/or before bedtime. These supplements can be purchased at most local grocery stores, pharmacies, and chain [employer]-stores. If you have any questions about your diet or nutrition, call the hospital and ask for the dietitian.   REGULAR, LOW FAT, LOW SALT, LOW CHOLESTEROL DIET

## 2023-03-16 NOTE — PROGRESS NOTES
Discharge instructions given. Patient verbalized understanding. Discharged home with self care. Patient refuses wheelchair discharge.

## 2023-03-16 NOTE — ED NOTES
Perfect serve sent to hospitalist regarding patient's chest discomfort.      Royal Livingston RN  03/15/23 2012

## 2023-03-16 NOTE — PROGRESS NOTES
[address]       ELECTROPHYSIOLOGY Progress  NOTE      Attestation  I have seen,interviewed and examined the patient with the resident. Pertinent medical data and imaging studies reviewed. Refer to the residents note for details of clinical findings  I agree with his assessment and plan with following addendum:     No recurrence of SVT over the night  Echocardiogram today  If echo is normal, then okay to discharge from cardiology standpoint  She is scheduled for SVT ablation on March 31st  I have placed her procedural instructions under \" discharge instructions\"  She will also receive Tangler message with the procedural instructions    Pérez Palmer MD   Cardiac Electrophysiology  [facility] [phone]    Chief Complaint: palpitations     Interval Hx:  Jeffrey Adler. Afeb, other vitals stable. No tachyarrhythmias overnight. Was admitted to the hospital due to rising trop but asymptomatic. Trop 0.02 > 0.06  Now has trended down to 0.02 >  less than 0.01        Subjective:     72-year-old female with a past medical history of morbid obesity, sleep apnea waiting to get a CPAP, heart palpitations presented via [facility] for heart palpitations accompanied with lightheadedness. She was recently seen by cardiology for this and given a 7-day CAM however there any episodes of any abnormal rhythms. The monitor was removed after 7 days. Today the patient was working in her kitchen when she again experienced a racing heart associated with lightheadedness and dizziness. She asked her friend to call the [employer] who brought her to the hospital.  They reported to her that her heart rate was in the 250s and gave her adenosine x2 which resolved the fast heart rate and brought her back to normal sinus rhythm. When I saw her she was in normal sinus rhythm with no active complaints. We were able to get a hold of the [employer] and it was consistent with SVT.     Her electrolytes were normal on presentation. She is scheduled to follow-up for a titration study for sleep apnea on March 27. Past Medical History:   Diagnosis Date    Class 3 severe obesity due to excess calories without serious comorbidity with body mass index (BMI) of 50.0 to 59.9 in adult [facility]) 02/15/2023    Hypertension     Prediabetes     SVT (supraventricular tachycardia) ([facility] [address] 09/02/2022    TIA (transient ischemic attack)      History reviewed. No pertinent surgical history. Family History:      Problem Relation Age of Onset    Sleep Apnea Brother        Other significant clinical information:  Allergies   Allergen Reactions    Dilaudid [Hydromorphone Hcl] Anaphylaxis        hydrOXYzine HCl  25 mg Oral Once    aspirin  81 mg Oral Daily    metoprolol tartrate  25 mg Oral BID    sodium chloride flush  5-40 mL IntraVENous 2 times per day    insulin lispro  0-4 Units SubCUTAneous TID WC    insulin lispro  0-4 Units SubCUTAneous Nightly    enoxaparin  40 mg SubCUTAneous BID       Review of Systems  -Negative except HPI    Vitals:    03/15/23 2018 03/15/23 2220 03/16/23 0315 03/16/23 0834   BP: 118/73 (!) 133/91 (!) 133/90 118/81   Pulse: 76 79 86 77   Resp: 18 20 18 20   Temp:  98.2 °F (36.8 °C) 98 °F (36.7 °C) 97.7 °F (36.5 °C)   TempSrc:  Oral Oral Oral   SpO2: 97% 100% 100% 100%   Weight:  (!) 330 lb 11 oz (150 kg)     Height:  5' 6\" (1.676 m)         Physical Exam:   Vitals:  T-max:  Patient Vitals for the past 8 hrs:   BP Temp Temp src Pulse Resp SpO2   03/16/23 0834 118/81 97.7 °F (36.5 °C) Oral 77 20 100 %   03/16/23 0315 (!) 133/90 98 °F (36.7 °C) Oral 86 18 100 %         Intake/Output Summary (Last 24 hours) at 3/16/2023 0933  Last data filed at 3/15/2023 2220  Gross per 24 hour   Intake 240 ml   Output 700 ml   Net -460 ml         Physical Exam  Constitutional:       Appearance: Normal appearance. She is obese. She is not ill-appearing. HENT:      Head: Normocephalic and atraumatic.       Nose: No congestion. Mouth/Throat:      Mouth: Mucous membranes are moist   Eyes:      Extraocular Movements: Extraocular movements intact. Pupils: Pupils are equal, round, and reactive to light. Cardiovascular:      Rate and Rhythm: Regular rhythm. Nml rate present. Heart sounds: No murmur heard. No gallop. Pulmonary:      Effort: No respiratory distress. Breath sounds: No rhonchi or rales. Chest:      Chest wall: No tenderness. Abdominal:      General: There is no distension. Palpations: There is no mass. Tenderness: There is no abdominal tenderness. There is no right CVA tenderness, left CVA tenderness or guarding. Hernia: No hernia is present. Musculoskeletal:         General: No tenderness. Right lower leg: No edema. Left lower leg: No edema. Skin:     Capillary Refill: Capillary refill takes less than 2 seconds. Coloration: Skin is not jaundiced. Findings: No bruising or lesion. Neurological:      Mental Status: She is alert and oriented to person, place, and time. Sensory: No sensory deficit. Gait: Gait normal.      Deep Tendon Reflexes: Reflexes normal.           CBC:   Recent Labs     03/15/23  1549   WBC 5.3   HGB 13.5   HCT 41.8      MCV 80.7       Renal:    Recent Labs     03/15/23  1549      K 3.6      CO2 27   BUN 13   CREATININE 0.8   GLUCOSE 90   CALCIUM 9.2   ANIONGAP 9       Hepatic: No results for input(s): AST, ALT, BILITOT, BILIDIR, PROT, LABALBU, ALKPHOS in the last 72 hours. Troponin:   Recent Labs     03/15/23  1726 03/15/23  2312 03/16/23  0430   TROPONINI 0.06* 0.02* <0.01       BNP: No results for input(s): BNP in the last 72 hours. Lipids: No results for input(s): CHOL, HDL in the last 72 hours. Invalid input(s): LDLCALCU, TRIGLYCERIDE  ABGs:  No results for input(s): PHART, VAQ8MAH, PO2ART, JVE5LVW, BEART, THGBART, U6UWDGIA, RNJ5ZDG in the last 72 hours.     INR: No results for input(s): INR in the last 72 hours. Lactate: No results for input(s): LACTATE in the last 72 hours. Cultures:  -----------------------------------------------------------------  RAD:   XR CHEST PORTABLE   Final Result      No acute cardiopulmonary findings. Assessment:       SVT  - EKG and presentation consistent with SVT at presentation  - Discussed different options with the patient and she agreed to moving forward with an EP study with possible ablation which we will schedule on March 31st around 2 PM.  - The patient will get instructions from the EP office on further testing prior to the procedure  -Admitted to the hospital due to rising trops which have normalized now. Trops can rise in the setting of SVT  -Pt needs an echo prior to EP study and since she is in the hospital we will order it so it can save her a trip.  Echo ordered  -ok to discharge home from EP stand point with metoprolol 25 mg BID  -will discuss with Dr. Marizol Wang    This patient will be discussed with attending, Dr. Sheila Brown MD PGY-3

## 2023-03-16 NOTE — CONSULTS
Cardiology Consultation/History and Physical                                                                  Pt Name: Paras Javier  Age: 27 y.o.  Sex: female  : 1995  Location: 4315/4315-01    Referring Physician: Mónica Salinas MD      Reason for Consult:     Reason for Consultation/Chief Complaint: Chest discomfort/Palpitations    HPI:      Paras Javier is a 27 y.o. female with a past medical history of palpitations, sleep apnea presented to the emergency department with complaints of chest discomfort/palpitations.    Patient with multiple ER visits.  She called EMS and found to be in SVT for which received adenosine with restoration of sinus rhythm.    Plan for Dr. Sahu to do an outpatient SVT ablation.    However, the patient's troponin was elevated overnight and she was kept for further evaluation.      Histories     Past Medical History:   has a past medical history of Class 3 severe obesity due to excess calories without serious comorbidity with body mass index (BMI) of 50.0 to 59.9 in adult ([facility]), Hypertension, Prediabetes, SVT (supraventricular tachycardia) ([facility]), and TIA (transient ischemic attack).    Surgical History:   has no past surgical history on file.     Social History:   reports that she quit smoking about 2 years ago. Her smoking use included cigarettes. She has a 0.40 pack-year smoking history. She has never used smokeless tobacco. She reports that she does not drink alcohol and does not use drugs.     Family History:  No evidence for sudden cardiac death or premature CAD      Medications:       Home Medications  Were reviewed and are listed in nursing record. and/or listed below  Prior to Admission medications    Medication Sig Start Date End Date Taking? Authorizing Provider   Dulaglutide (TRULICITY SC) Inject into the skin   Yes Historical Provider, MD   aspirin 81 MG chewable tablet Take 1 tablet by mouth daily 23   Caroline Valente MD   metoprolol tartrate (LOPRESSOR)  25 MG tablet Take 1 tablet by mouth 2 times daily 1/22/23   Johannah Cushing, MD          Inpatient Medications:   hydrOXYzine HCl  25 mg Oral Once    metoprolol tartrate  50 mg Oral BID    aspirin  81 mg Oral Daily    sodium chloride flush  5-40 mL IntraVENous 2 times per day    insulin lispro  0-4 Units SubCUTAneous TID WC    insulin lispro  0-4 Units SubCUTAneous Nightly    enoxaparin  40 mg SubCUTAneous BID       IV drips:   dextrose      sodium chloride         PRN:  glucose, dextrose bolus **OR** dextrose bolus, glucagon (rDNA), dextrose, sodium chloride flush, sodium chloride, ondansetron **OR** ondansetron, acetaminophen **OR** acetaminophen, polyethylene glycol    Allergy:     Dilaudid [hydromorphone hcl]       Review of Systems:     All 12 point review of symptoms completed. Pertinent positives identified in the HPI, all other review of symptoms negative. Physical Examination:     Vitals:    03/16/23 0834 03/16/23 0945 03/16/23 1237 03/16/23 1257   BP: 118/81  108/74 112/75   Pulse: 77 76 73 71   Resp: 20  18    Temp: 97.7 °F (36.5 °C)  97.8 °F (36.6 °C)    TempSrc: Oral  Oral    SpO2: 100%  98%    Weight:       Height:           Wt Readings from Last 3 Encounters:   03/15/23 (!) 330 lb 11 oz (150 kg)   03/07/23 (!) 338 lb 3.2 oz (153.4 kg)   02/15/23 (!) 335 lb (152 kg)       Physical Exam  Constitutional:       Appearance: Normal appearance. HENT:      Head: Normocephalic and atraumatic. Nose: Nose normal.   Eyes:      Conjunctiva/sclera: Conjunctivae normal.   Cardiovascular:      Rate and Rhythm: Normal rate and regular rhythm. Heart sounds: Normal heart sounds. Pulmonary:      Effort: Pulmonary effort is normal.      Breath sounds: Normal breath sounds. Abdominal:      Palpations: Abdomen is soft. Musculoskeletal:      Cervical back: Neck supple. Skin:     General: Skin is warm and dry. Neurological:      General: No focal deficit present. Mental Status: She is alert. Labs:     Recent Labs     03/15/23  1549      K 3.6   BUN 13   CREATININE 0.8      CO2 27   GLUCOSE 90   CALCIUM 9.2     Recent Labs     03/15/23  1549   WBC 5.3   HGB 13.5   HCT 41.8      MCV 80.7     No results for input(s): CHOLTOT, TRIG, HDL, CHOLHDL, LDL in the last 72 hours. Invalid input(s): Pari Clear  No results for input(s): PTT, INR in the last 72 hours. Invalid input(s): PT  Recent Labs     03/15/23  1549 03/15/23  1726 03/15/23  2312 03/16/23  0430   TROPONINI 0.02* 0.06* 0.02* <0.01     No results for input(s): BNP in the last 72 hours. No results for input(s): TSH in the last 72 hours. No results for input(s): CHOL, HDL, LDLCALC, TRIG in the last 72 hours.]    Lab Results   Component Value Date    TROPONINI <0.01 03/16/2023         Imaging:           Assessment / Plan:     Elevated troponin likely secondary to demand  SVT  Obstructive sleep apnea  Morbid obesity    We will go ahead and give her an additional dose of metoprolol 50 mg p.o. now and then proceed with CTA of the coronaries  B-hCG performed during admission was negative  If no significant disease, proceed with discharge and follow-up as an outpatient. Case discussed with Dr Rafael Bowser    The note was completed using EMR and Dragon dictation system. Every effort was made to ensure accuracy; however, inadvertent computerized transcription errors may be present. I would like to thank you for providing me the opportunity to participate in the care of your patient. If you have any questions, please do not hesitate to contact me.      Seth Amin MD, [facility]  The [address] [address]  Ph: [phone]  Fax: [phone]

## 2023-03-16 NOTE — ED NOTES
Patient reporting chest discomfort. Arturo at bedside to do EKG.      Don Davidson RN  03/15/23 2003

## 2023-03-16 NOTE — PROGRESS NOTES
4 Eyes Admission Assessment     I agree as the admission nurse that 2 RN's have performed a thorough Head to Toe Skin Assessment on the patient. ALL assessment sites listed below have been assessed on admission. Areas assessed by both nurses: Radha and Babita  [x]   Head, Face, and Ears   [x]   Shoulders, Back, and Chest  [x]   Arms, Elbows, and Hands   [x]   Coccyx, Sacrum, and Ischium  [x]   Legs, Feet, and Heels        Does the Patient have Skin Breakdown?   No         Oleksandr Prevention initiated:  No   Wound Care Orders initiated:  No      [facility] nurse consulted for Pressure Injury (Stage 3,4, Unstageable, DTI, NWPT, and Complex wounds) or Oleksandr score 18 or lower:  No      Nurse 1 eSignature: Electronically signed by Donnie Miller RN on 3/16/23 at 3:10 AM EDT    **SHARE this note so that the co-signing nurse is able to place an eSignature**    Nurse 2 eSignature: Electronically signed by Christianne Bui RN on 3/15/23 at 10:30 PM EDT

## 2023-03-16 NOTE — ED NOTES
ED TO INPATIENT SBAR HANDOFF    Patient Name: Douglas Lay   :  1995  32 y.o. MRN:  [id number]  ED Room #:  T56/Q14-88  Family/Caregiver Present yes - pt anxious that  cannot stay overnight  Restraints no   Sitter no   Sepsis Risk Score Sepsis Risk Score: 0.36    Situation  Code Status: No Order No additional code details. Allergies: Dilaudid [hydromorphone hcl]  Weight: Patient Vitals for the past 96 hrs (Last 3 readings):   Weight   03/15/23 1453 (!) 337 lb 12.8 oz (153.2 kg)     Arrived from: home  Chief Complaint:   Chief Complaint   Patient presents with    Tachycardia     Pt was in SVT; EMS gave 6 & 12 of adenosine which converted her to sinus. Rate now 107. Hospital Problem/Diagnosis:  Principal Problem:    SVT (supraventricular tachycardia) ([facility] [address]  Resolved Problems:    * No resolved hospital problems. *    Imaging:   XR CHEST PORTABLE   Final Result      No acute cardiopulmonary findings. Abnormal labs:   Abnormal Labs Reviewed   TROPONIN - Abnormal; Notable for the following components:       Result Value    Troponin 0.02 (*)     All other components within normal limits   TROPONIN - Abnormal; Notable for the following components:    Troponin 0.06 (*)     All other components within normal limits     Critical values: first troponin 0.2, second troponin 0.6    Abnormal Assessment Findings: morbidly obese, anxious appearing female. Heart and lung sounds WDL. Alert and oriented, ambulatory at baseline.     Background  History:   Past Medical History:   Diagnosis Date    Class 3 severe obesity due to excess calories without serious comorbidity with body mass index (BMI) of 50.0 to 59.9 in adult [facility]) 02/15/2023    Hypertension     Prediabetes     SVT (supraventricular tachycardia) ([facility] [address] 2022    TIA (transient ischemic attack)        Assessment    Vitals/MEWS: MEWS Score: 2  Level of Consciousness: Alert (0)   Vitals:    03/15/23 1542 03/15/23 1729 03/15/23 1854 03/15/23 2018   BP: 123/89 125/80 122/85 118/73   Pulse: (!) 107 95 77 76   Resp:  18 16 18   Temp:       TempSrc:       SpO2:  98% 98% 97%   Weight:         FiO2 (%): none  O2 Flow Rate: O2 Device: None (Room air)    Cardiac Rhythm:    Pain Assessment:  [] Verbal [] Urban Claude Scale  Pain Scale:    Last documented pain score (0-10 scale)    Last documented pain medication administered: none  Mental Status: oriented, alert, coherent, and able to concentrate and follow conversation  Orientation Level:    NIH Score:    C-SSRS: Risk of Suicide: No Risk  Bedside swallow:    Fontana Coma Scale (GCS): Active LDA's:   Peripheral IV 03/15/23 Right Hand (Active)   Site Assessment Clean, dry & intact 03/15/23 1504   Line Status Normal saline locked 03/15/23 1504   Phlebitis Assessment No symptoms 03/15/23 1504   Infiltration Assessment 0 03/15/23 1504   Dressing Status Clean, dry & intact 03/15/23 1504     PO Status: Regular  Pertinent or High Risk Medications/Drips: no   If Yes, please provide details: n/a  Pending Blood Product Administration: no       You may also review the ED PT Care Timeline found under the Summary Nursing Index tab. Recommendation    Pending orders: admission orders only; no ED orders pending  Plan for Discharge (if known): Additional Comments: patient is anxious about everything going on and what could be causing her issues. She can get a little bit agitated if she is feeling anxious but was quick to apologize to me when this happened. She asks a lot of reasonable questions about test results, etc.  If any further questions, please call Sending RN at [phone].     Electronically signed by: Electronically signed by Yeny Dong RN on 3/15/2023 at 8:45 PM       Yeny Dong RN  03/15/23 4646

## 2023-03-16 NOTE — FLOWSHEET NOTE
CTA cardiac study completed. Patient had 1-5 mg dose of metoprolol. Patient was very anxious during study, asked to be taken out of scanner, she was taken out and sat on side of bed. Returned to room in stable condition. VSS.

## 2023-03-18 ENCOUNTER — HOSPITAL ENCOUNTER (EMERGENCY)
Age: 28
Discharge: HOME | End: 2023-03-18
Attending: EMERGENCY MEDICINE
Payer: MEDICAID

## 2023-03-18 VITALS
BODY MASS INDEX: 47.09 KG/M2 | SYSTOLIC BLOOD PRESSURE: 126 MMHG | HEIGHT: 66 IN | DIASTOLIC BLOOD PRESSURE: 85 MMHG | TEMPERATURE: 97.9 F | HEART RATE: 74 BPM | OXYGEN SATURATION: 100 % | RESPIRATION RATE: 18 BRPM | WEIGHT: 293 LBS

## 2023-03-18 DIAGNOSIS — I47.1 PAROXYSMAL SUPRAVENTRICULAR TACHYCARDIA (HCC): Primary | ICD-10-CM

## 2023-03-18 LAB
GLUCOSE BLD-MCNC: 118 MG/DL (ref 70–99)
PERFORMED ON: ABNORMAL

## 2023-03-18 PROCEDURE — 99283 EMERGENCY DEPT VISIT LOW MDM: CPT

## 2023-03-18 PROCEDURE — 93005 ELECTROCARDIOGRAM TRACING: CPT

## 2023-03-18 ASSESSMENT — PAIN - FUNCTIONAL ASSESSMENT: PAIN_FUNCTIONAL_ASSESSMENT: 0-10

## 2023-03-18 ASSESSMENT — PAIN DESCRIPTION - ORIENTATION: ORIENTATION: RIGHT;LEFT;ANTERIOR;POSTERIOR

## 2023-03-18 ASSESSMENT — PAIN DESCRIPTION - DESCRIPTORS: DESCRIPTORS: ACHING;DISCOMFORT

## 2023-03-18 ASSESSMENT — PAIN DESCRIPTION - ONSET: ONSET: ON-GOING

## 2023-03-18 ASSESSMENT — PAIN DESCRIPTION - FREQUENCY: FREQUENCY: CONTINUOUS

## 2023-03-18 ASSESSMENT — PAIN DESCRIPTION - LOCATION: LOCATION: CHEST;HEAD

## 2023-03-18 ASSESSMENT — PAIN SCALES - GENERAL: PAINLEVEL_OUTOF10: 8

## 2023-03-18 ASSESSMENT — PAIN DESCRIPTION - PAIN TYPE: TYPE: ACUTE PAIN

## 2023-03-18 NOTE — Clinical Note
Luisa Elizalde was seen and treated in our emergency department on 3/18/2023. She may return to work on 03/20/2023. If you have any questions or concerns, please don't hesitate to call.       Sonali Moore MD

## 2023-03-18 NOTE — Clinical Note
Rosaline Gagnon was seen and treated in our emergency department on 3/18/2023. She may return to work on 03/20/2023. If you have any questions or concerns, please don't hesitate to call.       Talisha Ellsworth MD

## 2023-03-19 LAB
EKG ATRIAL RATE: 64 BPM
EKG DIAGNOSIS: NORMAL
EKG P AXIS: 41 DEGREES
EKG P-R INTERVAL: 214 MS
EKG Q-T INTERVAL: 380 MS
EKG QRS DURATION: 84 MS
EKG QTC CALCULATION (BAZETT): 392 MS
EKG R AXIS: 41 DEGREES
EKG T AXIS: 20 DEGREES
EKG VENTRICULAR RATE: 64 BPM

## 2023-03-19 NOTE — ED PROVIDER NOTES
ED Attending Attestation Note     Date of evaluation: 3/18/2023    This patient was seen by the resident. I have seen and examined the patient, agree with the workup, evaluation, management and diagnosis. The care plan has been discussed. I have reviewed the ECG and concur with the resident's interpretation. My assessment reveals 26-year-old -American female who has a history of paroxysmal SVT who presents after having episode of SVT at home that she was able to break with vagal maneuvers. EKG here shows sinus rhythm. Clear lungs with no murmur. Has outpatient follow-up with electrophysiology. Encouraged vagal maneuvers at home and to call 911 return for SVT does not break with this or if she has other symptoms such as loss of consciousness or near passing out or any other changes. Emilee Perdomo MD  03/18/23 6926

## 2023-03-19 NOTE — DISCHARGE INSTRUCTIONS
Call the doctor that manages your metoprolol to discuss any adjustments to this medication    Follow-up with electrophysiology on 3/31 as planned    As discussed, try some vagal movers to help terminate your SVT    If your SVT is persistent, you can call 911 return the emergency department. Should also seek emergent medical care if you have significant chest pain, difficulty breathing, or other emergent health concerns.

## 2023-03-19 NOTE — ED PROVIDER NOTES
[facility]          EM RESIDENT NOTE       Date of evaluation: 3/18/2023    Chief Complaint     Chest Pain (Mid chest around 1600, states called squad ? SVT, took cold shower- and HR better but does not feel right) and Headache (All over around 1600 started, tried tylenol 45 mins ago without relief )      History of Present Illness     Paras Green is a 32 y.o. female with a history of paroxysmal SVT, hypertension, prediabetes, TIA, FRANSICO (not yet on CPAP), and obesity who presents to the emergency department with episodes of SVT. She was just admitted and discharged from [facility] 3/15-3/16 for pSVT where she underwent an echo. She was started on metoprolol 25mg BID (previously taking as needed) and given referral to EP Cardiology for ablation - has appointment scheduled 3/31. Patient says she did not like how metoprolol affected her, so reduced the dose. Since being on this reduced dose, had recurrent episodes of SVT. Was having this at home. Called EMS, but this resolved by the time they were there. She states that they told her she should try putting cold water on her face if it happens again. Says her glucose was in the 50s per EMS check. Declined transport at that time. Then redeveloped SVT episodes after EMS left. Terminated episode with the cold water technique. She is now coming emergency department due to the frequency of these episodes and worry about her health. Has had a headache since one of these episodes, but no vision changes, n/v, or other associated sx. Does not smoke, drink alcohol, or consume caffeine. Unknown triggers, states her SVT was relatively quiet up until 3/13 when she started trulicity for her diabetes. Has been dx with FRANSICO, but requires second sleep study to determine type of CPAP, so does not have one yet. She is terrified that she may fall asleep and not wake up due to this and her cardiac symptoms.     Other than stated above, no associated factors or known alleviating/exacerbating factors are noted. Review of Systems     A complete ROS was performed and is otherwise negative except as described above. Past Medical, Surgical, Family, and Social History     She has a past medical history of Class 3 severe obesity due to excess calories without serious comorbidity with body mass index (BMI) of 50.0 to 59.9 in [facility]), Hypertension, Prediabetes, SVT (supraventricular tachycardia) ([address] and TIA (transient ischemic attack). Patient Active Problem List   Diagnosis    Paroxysmal supraventricular tachycardia (HCC)    Class 3 severe obesity due to excess calories without serious comorbidity with body mass index (BMI) of 50.0 to 59.9 in [facility])       She has no past surgical history on file. Her family history includes Sleep Apnea in her brother. She reports that she quit smoking about 2 years ago. Her smoking use included cigarettes. She has a 0.40 pack-year smoking history. She has never used smokeless tobacco. She reports that she does not drink alcohol and does not use drugs. No tobacco use, no alcohol use  Has 3 children at home with her    Medications     Discharge Medication List as of 3/18/2023 11:17 PM        CONTINUE these medications which have NOT CHANGED    Details   Dulaglutide (TRULICITY SC) Inject into the skin once a week MondaysHistorical Med      aspirin 81 MG chewable tablet Take 1 tablet by mouth daily, Disp-90 tablet, R-1Normal      metoprolol tartrate (LOPRESSOR) 25 MG tablet Take 1 tablet by mouth 2 times daily, Disp-180 tablet, R-4Print             Allergies     She is allergic to dilaudid [hydromorphone hcl]. Physical Exam     INITIAL VITALS: BP: 126/85, Temp: 97.9 °F (36.6 °C), Heart Rate: 74, Resp: 18, SpO2: 100 %     Triage and nursing notes reviewed. General: Non-toxic appearing. Worried and tearful. Head: Normocephalic, atraumatic. Neck: Supple. ROM intact.   ENT: No facial trauma or swelling. MMM. Eyes: PERRL. Sclerae non-icteric. Pulm: Normal WOB on RA. No tachypnea. Lungs CTAB but limited by habitus. CV: Regular rate. Regular rhythm. Abd: Soft, non-distended. Extremities: WWP. Peripheral pulses intact. No peripheral edema. Skin: Warm and dry. Neuro: Alert and oriented. No focal deficit. Speech and mentation normal. Moves all four extremities spontaneously. Psych: Worried, tearful    DiagnosticResults     EKG   Interpreted in conjunction with emergency department attending physician Dr. Lizet Laurent: Sinus rhythm with sinus arrhythmia and first-degree AV block ()  Rate: 64  Axis: normal  Ectopy: none  Conduction: 1st degree AV block  ST Segments: no acute change and normal  T Waves:no acute change and normal  Q Waves: none  Clinical Impression: Sinus arrhythmia with first-degree AV block, no acute changes  Comparison:  no significant changes from prior EKG 3/15/23 except     RADIOLOGY:  No orders to display       LABS:   Results for orders placed or performed during the hospital encounter of 03/18/23   POCT Glucose   Result Value Ref Range    POC Glucose 118 (H) 70 - 99 mg/dl    Performed on ACCU-CHEK        ED BEDSIDE ULTRASOUND:  No results found. RECENT VITALS:  BP: 126/85, Temp: 97.9 °F (36.6 °C), Heart Rate: 74,Resp: 18, SpO2: 100 %     Procedures     None    ED Course     Nursing Notes, Past Medical Hx, Past Surgical Hx, Social Hx, Allergies, and Family Hx were reviewed. The patient was given the followingmedications:  No orders of the defined types were placed in this encounter. CONSULTS:  None    MEDICAL DECISION MAKING / ASSESSMENT / PLAN     In summary, this is a 32 y.o. female presenting to recurrence of SVT at home. She was recently discharged from an admission for paroxysmal SVT with plan to follow-up with EP cardiology for ablation later this month.   She was put on an increased dose of metoprolol, which she did not like how it affected her, so this was reduced. Since reducing this dose, has had more frequent episodes of SVT at home. Lasting less than 15 minutes. Able to terminate using vagal maneuvers. She is distressed by the frequency of these episodes, prompting her ED visit today. He arrives hemodynamically stable and in no acute distress. She did not have any recurrence of SVT while in the emergency department. Her physical exam is reassuring. Counseled patient on this condition and addressed patient's concerns. He is encouraged to continue to use vagal maneuvers to try to terminate these episodes but to seek emergent medical care or call 911 if these episodes become persistent or if she develops other new or worsening symptoms. It appears that her symptoms were better improved with higher dose of metoprolol, so she is encouraged to follow-up with the physician who prescribed this medication to discuss dosing or potential other medication change. She does have plan for ablation upcoming for hopeful definitive treatment of her SVT. Patient feels reassured after today's visit and counseling and is appropriate for discharge with ED return precautions. ED Course as of 03/19/23 0451   Sat Mar 18, 2023   2201 POC Glucose(!): 118 [IL]      ED Course User Index  [IL] Talisha Ellsworth MD         This patient was also evaluated by the attending physician. All care plans were discussed and agreed upon. Clinical Impression     1. Paroxysmal supraventricular tachycardia (HCC)        Disposition     PATIENT REFERRED TO:  No follow-up provider specified.     DISCHARGE MEDICATIONS:  Discharge Medication List as of 3/18/2023 11:17 PM          DISPOSITION Decision To Discharge 03/18/2023 11:05:15 PM       Talisha Ellsworth MD  Resident  03/19/23 3385

## 2023-03-19 NOTE — ED NOTES
Patient discharged to home via family. Written discharge instructions reviewed with understanding. Copy of AVS sent home with patient. Patient able to walk from ED without assistance.         Beth Wright RN  03/18/23 9200

## 2023-03-20 ENCOUNTER — HOSPITAL ENCOUNTER (EMERGENCY)
Age: 28
Discharge: HOME | End: 2023-03-20
Attending: EMERGENCY MEDICINE
Payer: MEDICAID

## 2023-03-20 ENCOUNTER — TELEPHONE (OUTPATIENT)
Dept: PULMONOLOGY | Age: 28
End: 2023-03-20

## 2023-03-20 VITALS
WEIGHT: 293 LBS | TEMPERATURE: 97.6 F | HEART RATE: 64 BPM | SYSTOLIC BLOOD PRESSURE: 147 MMHG | RESPIRATION RATE: 16 BRPM | OXYGEN SATURATION: 100 % | HEIGHT: 66 IN | BODY MASS INDEX: 47.09 KG/M2 | DIASTOLIC BLOOD PRESSURE: 92 MMHG

## 2023-03-20 DIAGNOSIS — I47.1 PAROXYSMAL SUPRAVENTRICULAR TACHYCARDIA (HCC): ICD-10-CM

## 2023-03-20 DIAGNOSIS — R51.9 NONINTRACTABLE EPISODIC HEADACHE, UNSPECIFIED HEADACHE TYPE: Primary | ICD-10-CM

## 2023-03-20 LAB
ALBUMIN SERPL-MCNC: 4.5 G/DL (ref 3.4–5)
ANION GAP SERPL CALCULATED.3IONS-SCNC: 11 MMOL/L (ref 3–16)
BUN SERPL-MCNC: 13 MG/DL (ref 7–20)
CALCIUM SERPL-MCNC: 9.8 MG/DL (ref 8.3–10.6)
CHLORIDE SERPL-SCNC: 100 MMOL/L (ref 99–110)
CO2 SERPL-SCNC: 26 MMOL/L (ref 21–32)
CREAT SERPL-MCNC: 0.9 MG/DL (ref 0.6–1.1)
EKG ATRIAL RATE: 58 BPM
EKG DIAGNOSIS: NORMAL
EKG P AXIS: 22 DEGREES
EKG P-R INTERVAL: 174 MS
EKG Q-T INTERVAL: 394 MS
EKG QRS DURATION: 82 MS
EKG QTC CALCULATION (BAZETT): 386 MS
EKG R AXIS: 52 DEGREES
EKG T AXIS: 17 DEGREES
EKG VENTRICULAR RATE: 58 BPM
GFR SERPLBLD CREATININE-BSD FMLA CKD-EPI: >60 ML/MIN/{1.73_M2}
GLUCOSE SERPL-MCNC: 79 MG/DL (ref 70–99)
HCG UR QL: NEGATIVE
PHOSPHATE SERPL-MCNC: 3.4 MG/DL (ref 2.5–4.9)
POTASSIUM SERPL-SCNC: 4.3 MMOL/L (ref 3.5–5.1)
SODIUM SERPL-SCNC: 137 MMOL/L (ref 136–145)

## 2023-03-20 PROCEDURE — 80069 RENAL FUNCTION PANEL: CPT

## 2023-03-20 PROCEDURE — 6370000000 HC RX 637 (ALT 250 FOR IP)

## 2023-03-20 PROCEDURE — 84703 CHORIONIC GONADOTROPIN ASSAY: CPT

## 2023-03-20 PROCEDURE — 99284 EMERGENCY DEPT VISIT MOD MDM: CPT

## 2023-03-20 PROCEDURE — 93005 ELECTROCARDIOGRAM TRACING: CPT | Performed by: EMERGENCY MEDICINE

## 2023-03-20 RX ORDER — ACETAMINOPHEN 500 MG
1000 TABLET ORAL
Status: COMPLETED | OUTPATIENT
Start: 2023-03-20 | End: 2023-03-20

## 2023-03-20 RX ADMIN — ACETAMINOPHEN 1000 MG: 500 TABLET ORAL at 14:29

## 2023-03-20 ASSESSMENT — PAIN DESCRIPTION - LOCATION
LOCATION: CHEST
LOCATION: HEAD

## 2023-03-20 ASSESSMENT — PAIN - FUNCTIONAL ASSESSMENT
PAIN_FUNCTIONAL_ASSESSMENT: NONE - DENIES PAIN
PAIN_FUNCTIONAL_ASSESSMENT: 0-10
PAIN_FUNCTIONAL_ASSESSMENT: NONE - DENIES PAIN

## 2023-03-20 ASSESSMENT — PAIN DESCRIPTION - DESCRIPTORS: DESCRIPTORS: ACHING

## 2023-03-20 ASSESSMENT — ENCOUNTER SYMPTOMS
NAUSEA: 0
DIARRHEA: 0
CONSTIPATION: 0
SHORTNESS OF BREATH: 0
CHEST TIGHTNESS: 0
VOMITING: 0

## 2023-03-20 ASSESSMENT — PAIN SCALES - GENERAL: PAINLEVEL_OUTOF10: 2

## 2023-03-20 NOTE — DISCHARGE INSTRUCTIONS
Today you were seen in the Emergency department for chest pain, dizziness, and headache. You received an EKG to monitor your heart rhythm and it did not show any concerning findings that needed immediate attention. You also received blood work that looked normal and a pregnancy test that was negative for pregnancy. You received tylenol for your headache. Please follow with your appointment for your sleep study on March 25th and your ablation on March 31st.   Please return to the Emergency Department or call 911 if you have worsening of symptoms or persistent symptoms that do not go away.

## 2023-03-20 NOTE — TELEPHONE ENCOUNTER
Called patient back - she wanted to know if she would get a machine right after her study. I explained that once Dr. Carolyn Whitehead receives the results, the RT will call her and then a machine will be ordered. Patient feels she has severe sleep apnea and wants to know how long it takes to get a machine; informed her per Odette Galeazzi that a severe dx warrants an expedited order and it takes about a week (pending any disruptions during PA.) Patient stated she had no further questions.

## 2023-03-20 NOTE — TELEPHONE ENCOUNTER
Patient called requesting to speak with Dr. Martha Clancy, I asked for more information to take a message, patient just stated she had questions concerning her sleep study. [phone].

## 2023-03-20 NOTE — ED TRIAGE NOTES
Pt comes to the ED reporting dizziness, chest pain, and SOB. Pt states she has a scheduled cardiac ablation on 3/31. Pt also states she is late on her period with her last period being 2/8.

## 2023-03-20 NOTE — ED PROVIDER NOTES
ED Attending Attestation Note     Date of evaluation: 3/20/2023    This patient was seen by the resident. I have seen and examined the patient, agree with the workup, evaluation, management and diagnosis. The care plan has been discussed. I have reviewed the ECG and concur with the resident's interpretation. My assessment reveals adult female with now resolved symptoms of palpitations/shortness of breath that was paroxysmal, likely associated with some paroxysmal supraventricular tachycardia which has been previously diagnosed within the last week or so. She has normal heart rate normal vital signs here. She reports that she gets headache with this and does have a little bit of a persistent headache that is typical for it. Breathing is clear, no adventitious lung sounds appreciated. She is been previously worked up for it, has management plan in place, these appear to be paroxysms that do not require additional treatment right now.      Aureliano Gonzalez MD  03/20/23 4915
MAKING)     INITIAL VITALS: BP: 127/81, Temp: 97.6 °F (36.4 °C), Heart Rate: 56, Resp: 18, SpO2: 100 %     Kalina Stoddard is a 32 y.o. female with pmh of obesity, SVT, HTN, prediabetes, TIA, FRANSICO (not yet on CPAP) who presents with complaints of shortness of breath, headache, and dizziness. Pt. States that currently, shortness of breath and dizziness are no longer bothering her, with only a headache at the moment. Notably, the patient was in the hospital from  3/15-3/16 for pSVT where she underwent an echo. She also came to the ED 2 days ago on the 18th for recurrence of SVT at home and was sent home with instructions on how to terminate SVT with vagal maneuvers. Today, given that the symptoms for which she presented are resolved, her EKG does not show any acute concerning findings or changes from the one 2 days prior, and her renal function panel does not show any abnormalities, along with a negative pregnancy test. She did received Tylenol in the ED that helped with the headache. Vital signs were all within normal limits. She will be sent home with instructions on how to help curb an SVT episode with vagal maneuvers, ice-cold water on the face, coughing, or bearing down and to come to the hospital if she is unable to break an episode of SVT. She is encouraged to call 911  or seek emergent care if these episodes become persistent and she develops new or worsening symptoms. She is encouraged to follow up with her PCP before her ablation that is scheduled for the 31st.    Is this patient to be included in the SEP-1 core measure due to severe sepsis or septic shock? No Exclusion criteria - the patient is NOT to be included for SEP-1 Core Measure due to:  Infection is not suspected    Medical Decision Making  Problems Addressed:  Nonintractable episodic headache, unspecified headache type: undiagnosed new problem with uncertain prognosis  Paroxysmal supraventricular tachycardia ([address]): chronic illness or injury with

## 2023-03-21 ENCOUNTER — HOSPITAL ENCOUNTER (EMERGENCY)
Age: 28
Discharge: HOME | End: 2023-03-21
Attending: EMERGENCY MEDICINE
Payer: MEDICAID

## 2023-03-21 ENCOUNTER — HOSPITAL ENCOUNTER (OUTPATIENT)
Dept: SLEEP CENTER | Age: 28
Discharge: HOME | End: 2023-03-21
Payer: MEDICAID

## 2023-03-21 ENCOUNTER — TELEPHONE (OUTPATIENT)
Dept: PULMONOLOGY | Age: 28
End: 2023-03-21

## 2023-03-21 ENCOUNTER — APPOINTMENT (OUTPATIENT)
Dept: GENERAL RADIOLOGY | Age: 28
End: 2023-03-21
Payer: MEDICAID

## 2023-03-21 ENCOUNTER — TELEPHONE (OUTPATIENT)
Dept: CARDIOLOGY CLINIC | Age: 28
End: 2023-03-21

## 2023-03-21 VITALS
DIASTOLIC BLOOD PRESSURE: 97 MMHG | SYSTOLIC BLOOD PRESSURE: 156 MMHG | HEART RATE: 65 BPM | OXYGEN SATURATION: 100 % | TEMPERATURE: 98.4 F | RESPIRATION RATE: 18 BRPM

## 2023-03-21 VITALS
SYSTOLIC BLOOD PRESSURE: 136 MMHG | OXYGEN SATURATION: 100 % | TEMPERATURE: 98.5 F | HEIGHT: 66 IN | WEIGHT: 293 LBS | BODY MASS INDEX: 47.09 KG/M2 | DIASTOLIC BLOOD PRESSURE: 76 MMHG | HEART RATE: 60 BPM | RESPIRATION RATE: 10 BRPM

## 2023-03-21 DIAGNOSIS — G47.33 OBSTRUCTIVE SLEEP APNEA (ADULT) (PEDIATRIC): ICD-10-CM

## 2023-03-21 DIAGNOSIS — R06.00 DYSPNEA AND RESPIRATORY ABNORMALITIES: Primary | ICD-10-CM

## 2023-03-21 DIAGNOSIS — R06.02 SHORTNESS OF BREATH: ICD-10-CM

## 2023-03-21 DIAGNOSIS — R06.89 DYSPNEA AND RESPIRATORY ABNORMALITIES: Primary | ICD-10-CM

## 2023-03-21 DIAGNOSIS — R52 GENERALIZED BODY ACHES: Primary | ICD-10-CM

## 2023-03-21 LAB
ANION GAP SERPL CALCULATED.3IONS-SCNC: 9 MMOL/L (ref 3–16)
BASOPHILS # BLD: 0.1 K/UL (ref 0–0.2)
BASOPHILS NFR BLD: 1.2 %
BUN SERPL-MCNC: 12 MG/DL (ref 7–20)
CALCIUM SERPL-MCNC: 9.5 MG/DL (ref 8.3–10.6)
CHLORIDE SERPL-SCNC: 101 MMOL/L (ref 99–110)
CO2 SERPL-SCNC: 25 MMOL/L (ref 21–32)
CREAT SERPL-MCNC: 0.8 MG/DL (ref 0.6–1.1)
DEPRECATED RDW RBC AUTO: 14.1 % (ref 12.4–15.4)
EKG ATRIAL RATE: 66 BPM
EKG ATRIAL RATE: 71 BPM
EKG DIAGNOSIS: NORMAL
EKG DIAGNOSIS: NORMAL
EKG P AXIS: 41 DEGREES
EKG P AXIS: 60 DEGREES
EKG P-R INTERVAL: 170 MS
EKG P-R INTERVAL: 176 MS
EKG Q-T INTERVAL: 378 MS
EKG Q-T INTERVAL: 392 MS
EKG QRS DURATION: 78 MS
EKG QRS DURATION: 80 MS
EKG QTC CALCULATION (BAZETT): 410 MS
EKG QTC CALCULATION (BAZETT): 410 MS
EKG R AXIS: 45 DEGREES
EKG R AXIS: 70 DEGREES
EKG T AXIS: 41 DEGREES
EKG T AXIS: 6 DEGREES
EKG VENTRICULAR RATE: 66 BPM
EKG VENTRICULAR RATE: 71 BPM
EOSINOPHIL # BLD: 0.1 K/UL (ref 0–0.6)
EOSINOPHIL NFR BLD: 1 %
FLUAV RNA UPPER RESP QL NAA+PROBE: NEGATIVE
FLUBV AG NPH QL: NEGATIVE
GFR SERPLBLD CREATININE-BSD FMLA CKD-EPI: >60 ML/MIN/{1.73_M2}
GLUCOSE SERPL-MCNC: 83 MG/DL (ref 70–99)
HCT VFR BLD AUTO: 38.5 % (ref 36–48)
HGB BLD-MCNC: 13.1 G/DL (ref 12–16)
LYMPHOCYTES # BLD: 2.6 K/UL (ref 1–5.1)
LYMPHOCYTES NFR BLD: 31.6 %
MCH RBC QN AUTO: 27.2 PG (ref 26–34)
MCHC RBC AUTO-ENTMCNC: 34 G/DL (ref 31–36)
MCV RBC AUTO: 80.1 FL (ref 80–100)
MONOCYTES # BLD: 0.9 K/UL (ref 0–1.3)
MONOCYTES NFR BLD: 11.4 %
NEUTROPHILS # BLD: 4.5 K/UL (ref 1.7–7.7)
NEUTROPHILS NFR BLD: 54.8 %
PLATELET # BLD AUTO: 344 K/UL (ref 135–450)
PMV BLD AUTO: 8 FL (ref 5–10.5)
POTASSIUM SERPL-SCNC: 4 MMOL/L (ref 3.5–5.1)
RBC # BLD AUTO: 4.81 M/UL (ref 4–5.2)
SARS-COV-2 RDRP RESP QL NAA+PROBE: NOT DETECTED
SODIUM SERPL-SCNC: 135 MMOL/L (ref 136–145)
TROPONIN T SERPL-MCNC: <0.01 NG/ML
WBC # BLD AUTO: 8.3 K/UL (ref 4–11)

## 2023-03-21 PROCEDURE — 36415 COLL VENOUS BLD VENIPUNCTURE: CPT

## 2023-03-21 PROCEDURE — 99285 EMERGENCY DEPT VISIT HI MDM: CPT

## 2023-03-21 PROCEDURE — 6360000002 HC RX W HCPCS: Performed by: REGISTERED NURSE

## 2023-03-21 PROCEDURE — 85025 COMPLETE CBC W/AUTO DIFF WBC: CPT

## 2023-03-21 PROCEDURE — 96374 THER/PROPH/DIAG INJ IV PUSH: CPT

## 2023-03-21 PROCEDURE — 87804 INFLUENZA ASSAY W/OPTIC: CPT

## 2023-03-21 PROCEDURE — 99284 EMERGENCY DEPT VISIT MOD MDM: CPT

## 2023-03-21 PROCEDURE — 93005 ELECTROCARDIOGRAM TRACING: CPT | Performed by: REGISTERED NURSE

## 2023-03-21 PROCEDURE — 87635 SARS-COV-2 COVID-19 AMP PRB: CPT

## 2023-03-21 PROCEDURE — 84484 ASSAY OF TROPONIN QUANT: CPT

## 2023-03-21 PROCEDURE — 95811 POLYSOM 6/>YRS CPAP 4/> PARM: CPT

## 2023-03-21 PROCEDURE — 80048 BASIC METABOLIC PNL TOTAL CA: CPT

## 2023-03-21 PROCEDURE — 71046 X-RAY EXAM CHEST 2 VIEWS: CPT

## 2023-03-21 PROCEDURE — 93005 ELECTROCARDIOGRAM TRACING: CPT | Performed by: EMERGENCY MEDICINE

## 2023-03-21 RX ORDER — METOPROLOL TARTRATE 50 MG/1
25 TABLET, FILM COATED ORAL 2 TIMES DAILY
Qty: 60 TABLET | Refills: 5 | Status: SHIPPED
Start: 2023-03-21 | End: 2023-03-21 | Stop reason: SDUPTHER

## 2023-03-21 RX ORDER — KETOROLAC TROMETHAMINE 30 MG/ML
15 INJECTION, SOLUTION INTRAMUSCULAR; INTRAVENOUS ONCE
Status: COMPLETED | OUTPATIENT
Start: 2023-03-21 | End: 2023-03-21

## 2023-03-21 RX ORDER — METOPROLOL TARTRATE 50 MG/1
50 TABLET, FILM COATED ORAL 2 TIMES DAILY
Qty: 60 TABLET | Refills: 5 | Status: SHIPPED | OUTPATIENT
Start: 2023-03-21

## 2023-03-21 RX ADMIN — KETOROLAC TROMETHAMINE 15 MG: 30 INJECTION, SOLUTION INTRAMUSCULAR at 18:59

## 2023-03-21 ASSESSMENT — ENCOUNTER SYMPTOMS
SHORTNESS OF BREATH: 1
COUGH: 0
WHEEZING: 0

## 2023-03-21 ASSESSMENT — PAIN SCALES - GENERAL
PAINLEVEL_OUTOF10: 9
PAINLEVEL_OUTOF10: 5

## 2023-03-21 ASSESSMENT — PAIN DESCRIPTION - DESCRIPTORS
DESCRIPTORS: DISCOMFORT
DESCRIPTORS: ACHING

## 2023-03-21 ASSESSMENT — PAIN - FUNCTIONAL ASSESSMENT
PAIN_FUNCTIONAL_ASSESSMENT: 0-10
PAIN_FUNCTIONAL_ASSESSMENT: 0-10

## 2023-03-21 ASSESSMENT — PAIN DESCRIPTION - LOCATION
LOCATION: BACK;LEG
LOCATION: GENERALIZED

## 2023-03-21 ASSESSMENT — LIFESTYLE VARIABLES
HOW OFTEN DO YOU HAVE A DRINK CONTAINING ALCOHOL: NEVER
HOW MANY STANDARD DRINKS CONTAINING ALCOHOL DO YOU HAVE ON A TYPICAL DAY: PATIENT DOES NOT DRINK

## 2023-03-21 NOTE — ED TRIAGE NOTES
PAtient arrived in the ER with c/o sob and chest pain. Patient states that she woke up feel sob and states that she was in \"svt\". Patient states that she got in a cold  shower to help with sob.

## 2023-03-21 NOTE — ED NOTES
Discharge instructions explained by ED provider. Patient verbalized understanding and denies any other concerns or complaints at this time. Patient vital signs stable and no acute signs or symptoms of distress noted at discharge. Patient deemed clinically stable. Patient d/c home.      Clare Yeung RN  03/21/23 0711

## 2023-03-21 NOTE — ED PROVIDER NOTES
Basophils % 1.2 %    Neutrophils Absolute 4.5 1.7 - 7.7 K/uL    Lymphocytes Absolute 2.6 1.0 - 5.1 K/uL    Monocytes Absolute 0.9 0.0 - 1.3 K/uL    Eosinophils Absolute 0.1 0.0 - 0.6 K/uL    Basophils Absolute 0.1 0.0 - 0.2 K/uL   EKG 12 Lead   Result Value Ref Range    Ventricular Rate 66 BPM    Atrial Rate 66 BPM    P-R Interval 176 ms    QRS Duration 80 ms    Q-T Interval 392 ms    QTc Calculation (Bazett) 410 ms    P Axis 60 degrees    R Axis 70 degrees    T Axis 41 degrees    Diagnosis       EKG performed in ER and to be interpreted by ER physician. Confirmed by MD, ER ([id number]),  Grecia Shipman ([id number]) on 3/21/2023 5:46:05 AM   EKG 12 Lead   Result Value Ref Range    Ventricular Rate 71 BPM    Atrial Rate 71 BPM    P-R Interval 170 ms    QRS Duration 78 ms    Q-T Interval 378 ms    QTc Calculation (Bazett) 410 ms    P Axis 41 degrees    R Axis 45 degrees    T Axis 6 degrees    Diagnosis       EKG performed in ER and to be interpreted by ER physician. Confirmed by MD, ER ([id number]),  Grecia Shipman ([id number]) on 3/21/2023 5:53:04 AM     EKG   EKG shows sinus rhythm with a rate of 66, WI of 176, QRS of 80 and QTc 410. No acute ST segment elevations or depressions are noted. ED BEDSIDE ULTRASOUND:  No results found. MOST RECENT VITALS:  BP: (!) 156/97,Temp: 98.4 °F (36.9 °C), Heart Rate: 65, Resp: 18, SpO2: 100 %     Procedures         ED Course     Nursing Notes, Past Medical Hx, Past Surgical Hx, Social Hx,Allergies, and Family Hx were reviewed. The patient was given the following medications:  No orders of the defined types were placed in this encounter. CONSULTS:  None    Review of Systems     Review of Systems   Constitutional:  Positive for fatigue. Negative for chills and fever. Respiratory:  Positive for shortness of breath. Negative for cough and wheezing. Cardiovascular:  Positive for palpitations. Negative for chest pain. All other systems reviewed and are negative.     Past

## 2023-03-21 NOTE — ED NOTES
Patient asking questions about beta blocker already rx'ed. This nurse further discussed and patient continued to ask questions. Patient reported \"I am in SVT when it wakes me like that\". After further education and discussion this nurse findings is that when patient is saying she is in SVT she actually means palpitations from anxiety. Patient expresses high satisfaction from education and conversation. Patient in bed resting at this time. VSS. No acute signs or symptoms of distress noted.      Marlene Polanco RN  03/21/23 0354

## 2023-03-21 NOTE — DISCHARGE INSTRUCTIONS
You were seen in the emergency department with continued symptoms. While here in the emergency department your metoprolol seem to have improved your symptoms. We feel you are safe to present to your patient sleep study. However, if your symptoms persist or worsen at any time please return to the ED. Please continue all your prescription medications as prescribed.

## 2023-03-22 ENCOUNTER — TELEPHONE (OUTPATIENT)
Dept: PULMONOLOGY | Age: 28
End: 2023-03-22

## 2023-03-22 LAB
EKG ATRIAL RATE: 61 BPM
EKG DIAGNOSIS: NORMAL
EKG P AXIS: 49 DEGREES
EKG P-R INTERVAL: 190 MS
EKG Q-T INTERVAL: 398 MS
EKG QRS DURATION: 84 MS
EKG QTC CALCULATION (BAZETT): 400 MS
EKG R AXIS: 73 DEGREES
EKG T AXIS: 43 DEGREES
EKG VENTRICULAR RATE: 61 BPM

## 2023-03-22 NOTE — TELEPHONE ENCOUNTER
Spoke with pt. Pt is concerned about cardiac issues. Pt is wanting to be set up on cpap asap. Explained to pt that the physician will review the study and she will be called with results before a unit can be ordered.

## 2023-03-24 ENCOUNTER — TELEPHONE (OUTPATIENT)
Dept: CARDIOLOGY CLINIC | Age: 28
End: 2023-03-24

## 2023-03-24 ENCOUNTER — HOSPITAL ENCOUNTER (EMERGENCY)
Age: 28
Discharge: HOME | End: 2023-03-24
Attending: EMERGENCY MEDICINE
Payer: MEDICAID

## 2023-03-24 VITALS
HEART RATE: 54 BPM | SYSTOLIC BLOOD PRESSURE: 101 MMHG | BODY MASS INDEX: 47.09 KG/M2 | RESPIRATION RATE: 16 BRPM | WEIGHT: 293 LBS | TEMPERATURE: 98.2 F | OXYGEN SATURATION: 100 % | DIASTOLIC BLOOD PRESSURE: 64 MMHG | HEIGHT: 66 IN

## 2023-03-24 DIAGNOSIS — R51.9 ACUTE NONINTRACTABLE HEADACHE, UNSPECIFIED HEADACHE TYPE: Primary | ICD-10-CM

## 2023-03-24 PROCEDURE — 96372 THER/PROPH/DIAG INJ SC/IM: CPT

## 2023-03-24 PROCEDURE — 6360000002 HC RX W HCPCS: Performed by: EMERGENCY MEDICINE

## 2023-03-24 PROCEDURE — 99284 EMERGENCY DEPT VISIT MOD MDM: CPT

## 2023-03-24 PROCEDURE — 6370000000 HC RX 637 (ALT 250 FOR IP): Performed by: EMERGENCY MEDICINE

## 2023-03-24 RX ORDER — PROCHLORPERAZINE MALEATE 5 MG/1
10 TABLET ORAL ONCE
Status: COMPLETED | OUTPATIENT
Start: 2023-03-24 | End: 2023-03-24

## 2023-03-24 RX ORDER — KETOROLAC TROMETHAMINE 30 MG/ML
15 INJECTION, SOLUTION INTRAMUSCULAR; INTRAVENOUS ONCE
Status: COMPLETED | OUTPATIENT
Start: 2023-03-24 | End: 2023-03-24

## 2023-03-24 RX ADMIN — PROCHLORPERAZINE MALEATE 10 MG: 5 TABLET ORAL at 03:24

## 2023-03-24 RX ADMIN — KETOROLAC TROMETHAMINE 15 MG: 30 INJECTION, SOLUTION INTRAMUSCULAR at 03:24

## 2023-03-24 ASSESSMENT — ENCOUNTER SYMPTOMS
EYE PAIN: 0
DIARRHEA: 0
SHORTNESS OF BREATH: 0
COUGH: 0
ABDOMINAL PAIN: 0
NAUSEA: 1
WHEEZING: 0
VOMITING: 0

## 2023-03-24 ASSESSMENT — PAIN SCALES - GENERAL: PAINLEVEL_OUTOF10: 7

## 2023-03-24 ASSESSMENT — PAIN DESCRIPTION - LOCATION: LOCATION: HEAD

## 2023-03-24 ASSESSMENT — PAIN DESCRIPTION - DESCRIPTORS: DESCRIPTORS: ACHING

## 2023-03-24 NOTE — ED PROVIDER NOTES
[address] [facility]          ATTENDING PHYSICIAN NOTE       Date of evaluation: 3/24/2023    Chief Complaint     Headache (Going on for a few days now.)      History of Present Illness     Paras Aguilar is a 32 y.o. female who presents with a chief complaint of headache. That she does reports a diffuse global headache that been going on for several days. Constant and pressure-like in nature. It does seem to be worse when she is sleeping and she notices it the most when she wakes up with her sleep apnea spells. Has had some mild nausea but no vomiting. No documented fever. No meningismus or neck stiffness. Does have some mild photophobia. No blurred or double vision. No chest pain or difficulty breathing. No abdominal pain. ASSESSMENT / PLAN  (MEDICAL DECISION MAKING)     INITIAL VITALS: BP: 115/66, Temp: 98.2 °F (36.8 °C), Heart Rate: 54, Resp: 16, SpO2: 99 %      Paras Aguilar is a 32 y.o. female who presents with a benign-appearing headache. The neurologic examination is normal.  She is feeling better here with intramuscular ketorolac and oral Compazine. My suspicion for serious pathology is low given a lack of significant risk factors and reassuring history and physical examination. I see nothing to suggest subarachnoid hemorrhage, meningitis, encephalitis, mass lesion, bleeding or thrombosis. I feel the patient can be safely discharged to home with outpatient follow up. Instructions have been given for the patient to return if there is any significant worsening of the headache or the development of confusion, vision change, weakness, numbness, difficulty with speech or walking. Medical Decision Making  Risk  Prescription drug management. Clinical Impression     1.  Acute nonintractable headache, unspecified headache type        Disposition     PATIENT REFERRED TO:  [facility]          DISCHARGE MEDICATIONS:  New Prescriptions

## 2023-03-24 NOTE — TELEPHONE ENCOUNTER
Spoke with pt, she has concerns about holding her Metoprolol for 2 days before her ablation. She states she knows she will go back into SVT easily and is worried about holding it. Told pt that this a common practice before she can have the procedure done. Pt would like to speak to [facility] for advice should she go back into SVT again.   Will forward to [facility] RN to contact pt on Monday

## 2023-03-24 NOTE — ED NOTES
Discharge instructions reviewed with patient. Pt verbalized understanding.  Pt discharged home by self in [address] Phillip Eid RN  03/24/23 0446

## 2023-03-24 NOTE — TELEPHONE ENCOUNTER
She has an ablation coming up and it says don't take your medication 2 days before the procedure. She said if she doesn't take it, she will go into SVT. She want's to know if she can take it regularly.  Call back [phone]

## 2023-03-26 ENCOUNTER — HOSPITAL ENCOUNTER (EMERGENCY)
Age: 28
Discharge: HOME | End: 2023-03-26
Attending: EMERGENCY MEDICINE
Payer: MEDICAID

## 2023-03-26 ENCOUNTER — APPOINTMENT (OUTPATIENT)
Dept: GENERAL RADIOLOGY | Age: 28
End: 2023-03-26
Payer: MEDICAID

## 2023-03-26 VITALS
HEART RATE: 53 BPM | DIASTOLIC BLOOD PRESSURE: 51 MMHG | TEMPERATURE: 97.9 F | WEIGHT: 293 LBS | HEIGHT: 66 IN | RESPIRATION RATE: 16 BRPM | BODY MASS INDEX: 47.09 KG/M2 | OXYGEN SATURATION: 100 % | SYSTOLIC BLOOD PRESSURE: 99 MMHG

## 2023-03-26 DIAGNOSIS — R07.9 CHEST PAIN, UNSPECIFIED TYPE: Primary | ICD-10-CM

## 2023-03-26 LAB
ALBUMIN SERPL-MCNC: 4 G/DL (ref 3.4–5)
ALP SERPL-CCNC: 94 U/L (ref 40–129)
ALT SERPL-CCNC: 22 U/L (ref 10–40)
ANION GAP SERPL CALCULATED.3IONS-SCNC: 10 MMOL/L (ref 3–16)
AST SERPL-CCNC: 17 U/L (ref 15–37)
BACTERIA URNS QL MICRO: ABNORMAL /HPF
BASOPHILS # BLD: 0.1 K/UL (ref 0–0.2)
BASOPHILS NFR BLD: 1 %
BILIRUB DIRECT SERPL-MCNC: <0.2 MG/DL (ref 0–0.3)
BILIRUB INDIRECT SERPL-MCNC: NORMAL MG/DL (ref 0–1)
BILIRUB SERPL-MCNC: <0.2 MG/DL (ref 0–1)
BILIRUB UR QL STRIP.AUTO: NEGATIVE
BUN SERPL-MCNC: 9 MG/DL (ref 7–20)
CALCIUM SERPL-MCNC: 9.4 MG/DL (ref 8.3–10.6)
CHLORIDE SERPL-SCNC: 104 MMOL/L (ref 99–110)
CLARITY UR: CLEAR
CO2 SERPL-SCNC: 25 MMOL/L (ref 21–32)
COLOR UR: YELLOW
CREAT SERPL-MCNC: 0.8 MG/DL (ref 0.6–1.1)
DEPRECATED RDW RBC AUTO: 14.2 % (ref 12.4–15.4)
EOSINOPHIL # BLD: 0.1 K/UL (ref 0–0.6)
EOSINOPHIL NFR BLD: 1.6 %
EPI CELLS #/AREA URNS HPF: ABNORMAL /HPF (ref 0–5)
GFR SERPLBLD CREATININE-BSD FMLA CKD-EPI: >60 ML/MIN/{1.73_M2}
GLUCOSE SERPL-MCNC: 80 MG/DL (ref 70–99)
GLUCOSE UR STRIP.AUTO-MCNC: NEGATIVE MG/DL
HCG UR QL: NEGATIVE
HCT VFR BLD AUTO: 40.3 % (ref 36–48)
HGB BLD-MCNC: 13.1 G/DL (ref 12–16)
HGB UR QL STRIP.AUTO: NEGATIVE
HYALINE CASTS #/AREA URNS LPF: ABNORMAL /LPF (ref 0–2)
KETONES UR STRIP.AUTO-MCNC: NEGATIVE MG/DL
LEUKOCYTE ESTERASE UR QL STRIP.AUTO: ABNORMAL
LIPASE SERPL-CCNC: 33 U/L (ref 13–60)
LYMPHOCYTES # BLD: 2.9 K/UL (ref 1–5.1)
LYMPHOCYTES NFR BLD: 37.9 %
MCH RBC QN AUTO: 26.4 PG (ref 26–34)
MCHC RBC AUTO-ENTMCNC: 32.5 G/DL (ref 31–36)
MCV RBC AUTO: 81.3 FL (ref 80–100)
MONOCYTES # BLD: 0.8 K/UL (ref 0–1.3)
MONOCYTES NFR BLD: 10.2 %
MUCOUS THREADS #/AREA URNS LPF: ABNORMAL /LPF
NEUTROPHILS # BLD: 3.8 K/UL (ref 1.7–7.7)
NEUTROPHILS NFR BLD: 49.3 %
NITRITE UR QL STRIP.AUTO: NEGATIVE
PH UR STRIP.AUTO: 7 [PH] (ref 5–8)
PLATELET # BLD AUTO: 362 K/UL (ref 135–450)
PMV BLD AUTO: 8.4 FL (ref 5–10.5)
POTASSIUM SERPL-SCNC: 4.6 MMOL/L (ref 3.5–5.1)
PROT SERPL-MCNC: 7.5 G/DL (ref 6.4–8.2)
PROT UR STRIP.AUTO-MCNC: NEGATIVE MG/DL
RBC # BLD AUTO: 4.95 M/UL (ref 4–5.2)
RBC #/AREA URNS HPF: ABNORMAL /HPF (ref 0–4)
SODIUM SERPL-SCNC: 139 MMOL/L (ref 136–145)
SP GR UR STRIP.AUTO: 1.02 (ref 1–1.03)
TROPONIN T SERPL-MCNC: <0.01 NG/ML
UA DIPSTICK W REFLEX MICRO PNL UR: YES
URN SPEC COLLECT METH UR: ABNORMAL
UROBILINOGEN UR STRIP-ACNC: 0.2 E.U./DL
WBC # BLD AUTO: 7.8 K/UL (ref 4–11)
WBC #/AREA URNS HPF: ABNORMAL /HPF (ref 0–5)

## 2023-03-26 PROCEDURE — 80076 HEPATIC FUNCTION PANEL: CPT

## 2023-03-26 PROCEDURE — 80048 BASIC METABOLIC PNL TOTAL CA: CPT

## 2023-03-26 PROCEDURE — 83690 ASSAY OF LIPASE: CPT

## 2023-03-26 PROCEDURE — 84703 CHORIONIC GONADOTROPIN ASSAY: CPT

## 2023-03-26 PROCEDURE — 85025 COMPLETE CBC W/AUTO DIFF WBC: CPT

## 2023-03-26 PROCEDURE — 81001 URINALYSIS AUTO W/SCOPE: CPT

## 2023-03-26 PROCEDURE — 84484 ASSAY OF TROPONIN QUANT: CPT

## 2023-03-26 PROCEDURE — 6370000000 HC RX 637 (ALT 250 FOR IP): Performed by: EMERGENCY MEDICINE

## 2023-03-26 PROCEDURE — 71045 X-RAY EXAM CHEST 1 VIEW: CPT

## 2023-03-26 PROCEDURE — 93005 ELECTROCARDIOGRAM TRACING: CPT | Performed by: EMERGENCY MEDICINE

## 2023-03-26 PROCEDURE — 99284 EMERGENCY DEPT VISIT MOD MDM: CPT

## 2023-03-26 RX ORDER — ACETAMINOPHEN 325 MG/1
650 TABLET ORAL ONCE
Status: COMPLETED | OUTPATIENT
Start: 2023-03-26 | End: 2023-03-26

## 2023-03-26 RX ORDER — OMEPRAZOLE 20 MG/1
20 CAPSULE, DELAYED RELEASE ORAL
Qty: 30 CAPSULE | Refills: 0 | Status: SHIPPED | OUTPATIENT
Start: 2023-03-26

## 2023-03-26 RX ORDER — FAMOTIDINE 20 MG/1
20 TABLET, FILM COATED ORAL ONCE
Status: COMPLETED | OUTPATIENT
Start: 2023-03-26 | End: 2023-03-26

## 2023-03-26 RX ADMIN — FAMOTIDINE 20 MG: 20 TABLET ORAL at 23:03

## 2023-03-26 RX ADMIN — LIDOCAINE HYDROCHLORIDE: 20 SOLUTION ORAL; TOPICAL at 22:01

## 2023-03-26 RX ADMIN — ACETAMINOPHEN 650 MG: 325 TABLET ORAL at 23:02

## 2023-03-26 ASSESSMENT — PAIN - FUNCTIONAL ASSESSMENT: PAIN_FUNCTIONAL_ASSESSMENT: 0-10

## 2023-03-26 ASSESSMENT — PAIN DESCRIPTION - DESCRIPTORS
DESCRIPTORS: ACHING
DESCRIPTORS: HEAVINESS;BURNING

## 2023-03-26 ASSESSMENT — PAIN DESCRIPTION - LOCATION
LOCATION: CHEST
LOCATION: HEAD

## 2023-03-26 ASSESSMENT — PAIN SCALES - GENERAL: PAINLEVEL_OUTOF10: 7

## 2023-03-26 ASSESSMENT — PAIN DESCRIPTION - FREQUENCY: FREQUENCY: CONTINUOUS

## 2023-03-27 LAB
EKG ATRIAL RATE: 52 BPM
EKG DIAGNOSIS: NORMAL
EKG P AXIS: 32 DEGREES
EKG P-R INTERVAL: 172 MS
EKG Q-T INTERVAL: 416 MS
EKG QRS DURATION: 84 MS
EKG QTC CALCULATION (BAZETT): 386 MS
EKG R AXIS: 83 DEGREES
EKG T AXIS: 16 DEGREES
EKG VENTRICULAR RATE: 52 BPM

## 2023-03-27 NOTE — TELEPHONE ENCOUNTER
Pt called in stating she wanted to speak to Dr. Michael Bush. I asked if I could take a message. Pt states she has questions. I asked patient what it's pertaining to and was told about her sleep study. Please advise.     Ph. [phone]
See phone note 3/22
Metastatic disease

## 2023-03-27 NOTE — TELEPHONE ENCOUNTER
The patient called back regarding the previous messages. Please call the patient back at [phone].

## 2023-03-27 NOTE — ED TRIAGE NOTES
Pt c/o upper mid chest burning / heaviness that started around 2 pm today. States it feels like heart burn but a little worse, had some nausea earlier today but does not have any at the moment.  Also states she felt a little light headed

## 2023-03-27 NOTE — ED NOTES
Discharge instructions provided to patient by RN at bedside. No further concerns addressed at this time.      Ashley Graves RN  03/26/23 2972

## 2023-03-28 ENCOUNTER — APPOINTMENT (OUTPATIENT)
Dept: GENERAL RADIOLOGY | Age: 28
End: 2023-03-28
Payer: MEDICAID

## 2023-03-28 ENCOUNTER — TELEPHONE (OUTPATIENT)
Dept: PULMONOLOGY | Age: 28
End: 2023-03-28

## 2023-03-28 ENCOUNTER — HOSPITAL ENCOUNTER (EMERGENCY)
Age: 28
Discharge: HOME | End: 2023-03-29
Attending: EMERGENCY MEDICINE
Payer: MEDICAID

## 2023-03-28 ENCOUNTER — PREP FOR PROCEDURE (OUTPATIENT)
Dept: CARDIOLOGY CLINIC | Age: 28
End: 2023-03-28

## 2023-03-28 DIAGNOSIS — I47.1 PAROXYSMAL SUPRAVENTRICULAR TACHYCARDIA (HCC): Primary | ICD-10-CM

## 2023-03-28 LAB
ANION GAP SERPL CALCULATED.3IONS-SCNC: 11 MMOL/L (ref 3–16)
BASOPHILS # BLD: 0 K/UL (ref 0–0.2)
BASOPHILS NFR BLD: 0.6 %
BUN SERPL-MCNC: 13 MG/DL (ref 7–20)
CALCIUM SERPL-MCNC: 9.4 MG/DL (ref 8.3–10.6)
CHLORIDE SERPL-SCNC: 103 MMOL/L (ref 99–110)
CO2 SERPL-SCNC: 26 MMOL/L (ref 21–32)
CREAT SERPL-MCNC: 1.1 MG/DL (ref 0.6–1.1)
DEPRECATED RDW RBC AUTO: 13.9 % (ref 12.4–15.4)
EOSINOPHIL # BLD: 0.1 K/UL (ref 0–0.6)
EOSINOPHIL NFR BLD: 1.1 %
FLUAV RNA UPPER RESP QL NAA+PROBE: NEGATIVE
FLUBV AG NPH QL: NEGATIVE
GFR SERPLBLD CREATININE-BSD FMLA CKD-EPI: >60 ML/MIN/{1.73_M2}
GLUCOSE SERPL-MCNC: 132 MG/DL (ref 70–99)
HCT VFR BLD AUTO: 39.9 % (ref 36–48)
HGB BLD-MCNC: 12.8 G/DL (ref 12–16)
LYMPHOCYTES # BLD: 3.6 K/UL (ref 1–5.1)
LYMPHOCYTES NFR BLD: 46.8 %
MAGNESIUM SERPL-MCNC: 1.7 MG/DL (ref 1.8–2.4)
MCH RBC QN AUTO: 26.1 PG (ref 26–34)
MCHC RBC AUTO-ENTMCNC: 32 G/DL (ref 31–36)
MCV RBC AUTO: 81.6 FL (ref 80–100)
MONOCYTES # BLD: 0.7 K/UL (ref 0–1.3)
MONOCYTES NFR BLD: 8.8 %
NEUTROPHILS # BLD: 3.3 K/UL (ref 1.7–7.7)
NEUTROPHILS NFR BLD: 42.7 %
PHOSPHATE SERPL-MCNC: 3.1 MG/DL (ref 2.5–4.9)
PLATELET # BLD AUTO: 332 K/UL (ref 135–450)
PMV BLD AUTO: 8.3 FL (ref 5–10.5)
POTASSIUM SERPL-SCNC: 3.5 MMOL/L (ref 3.5–5.1)
RBC # BLD AUTO: 4.89 M/UL (ref 4–5.2)
SARS-COV-2 RDRP RESP QL NAA+PROBE: NOT DETECTED
SODIUM SERPL-SCNC: 140 MMOL/L (ref 136–145)
WBC # BLD AUTO: 7.7 K/UL (ref 4–11)

## 2023-03-28 PROCEDURE — 6370000000 HC RX 637 (ALT 250 FOR IP): Performed by: EMERGENCY MEDICINE

## 2023-03-28 PROCEDURE — 80048 BASIC METABOLIC PNL TOTAL CA: CPT

## 2023-03-28 PROCEDURE — 87635 SARS-COV-2 COVID-19 AMP PRB: CPT

## 2023-03-28 PROCEDURE — 99285 EMERGENCY DEPT VISIT HI MDM: CPT

## 2023-03-28 PROCEDURE — 93005 ELECTROCARDIOGRAM TRACING: CPT | Performed by: EMERGENCY MEDICINE

## 2023-03-28 PROCEDURE — 83735 ASSAY OF MAGNESIUM: CPT

## 2023-03-28 PROCEDURE — 85025 COMPLETE CBC W/AUTO DIFF WBC: CPT

## 2023-03-28 PROCEDURE — 36415 COLL VENOUS BLD VENIPUNCTURE: CPT

## 2023-03-28 PROCEDURE — 87804 INFLUENZA ASSAY W/OPTIC: CPT

## 2023-03-28 PROCEDURE — 84100 ASSAY OF PHOSPHORUS: CPT

## 2023-03-28 PROCEDURE — 96365 THER/PROPH/DIAG IV INF INIT: CPT

## 2023-03-28 PROCEDURE — 71046 X-RAY EXAM CHEST 2 VIEWS: CPT

## 2023-03-28 RX ORDER — ACETAMINOPHEN 325 MG/1
650 TABLET ORAL ONCE
Status: COMPLETED | OUTPATIENT
Start: 2023-03-28 | End: 2023-03-28

## 2023-03-28 RX ORDER — MAGNESIUM SULFATE 1 G/100ML
1000 INJECTION INTRAVENOUS ONCE
Status: COMPLETED | OUTPATIENT
Start: 2023-03-29 | End: 2023-03-29

## 2023-03-28 RX ORDER — POTASSIUM CHLORIDE 20 MEQ/1
40 TABLET, EXTENDED RELEASE ORAL ONCE
Status: COMPLETED | OUTPATIENT
Start: 2023-03-29 | End: 2023-03-29

## 2023-03-28 RX ADMIN — METOPROLOL TARTRATE 50 MG: 25 TABLET, FILM COATED ORAL at 22:21

## 2023-03-28 RX ADMIN — ACETAMINOPHEN 650 MG: 325 TABLET ORAL at 22:21

## 2023-03-28 ASSESSMENT — LIFESTYLE VARIABLES: HOW OFTEN DO YOU HAVE A DRINK CONTAINING ALCOHOL: NEVER

## 2023-03-28 ASSESSMENT — ENCOUNTER SYMPTOMS
EYES NEGATIVE: 1
SHORTNESS OF BREATH: 1
GASTROINTESTINAL NEGATIVE: 1
COUGH: 0

## 2023-03-28 ASSESSMENT — PAIN - FUNCTIONAL ASSESSMENT: PAIN_FUNCTIONAL_ASSESSMENT: NONE - DENIES PAIN

## 2023-03-28 NOTE — TELEPHONE ENCOUNTER
Patient called to confirm that she received the voice message from [facility]

## 2023-03-28 NOTE — TELEPHONE ENCOUNTER
Spoke with pt to review titration results. Order to be sent to [address] [place] [address] and request was made to expedite due to severity of sleep apnea. Pt to schedule f/u.

## 2023-03-28 NOTE — TELEPHONE ENCOUNTER
Left detailed VM for pt stating that she does not have to hold metoprolol prior to ablation on 3/31 (per UL). Asked that she call back to confirm that she received the message.

## 2023-03-28 NOTE — PROGRESS NOTES
Soledad Rios         : 1995  [facility]                                                                      Please expedite AHI 42.9 AHI Rem 101    Diagnosis: [x] FRANSICO (G47.33) [] CSA (G47.31) [] Apnea (G47.30)   Length of Need: [x] 18 Months [] 99 Months [] Other:    Machine (ROGELIO!): [] Respironics Dream Station   2   Auto [x] [employer] S11 [] Other:     []  CPAP () [x] Bilevel ()   Mode: [] Auto [] Spontaneous    Mode: [x] Auto [] Spontaneous       IPAP max 25 cmH2O  EPAP min 11 cmH2O  PS 3 cmH2O     Comfort Settings:   - Ramp Pressure: 5 cmH2O                                        - Ramp time: 15 min                                     -  Flex/EPR - 3 full time                                    - For ResMed Bilevel (TiMax-4 sec   TiMin- 0.2 sec)     Humidifier: [x] Heated ()        [x] Water chamber replacement ()/ 1 per 6 months        Mask:   [] Nasal () /1 per 3 months [x] Full Face () /1 per 3 months   [] Patient choice -Size and fit mask [x] Patient Choice - Size and fit mask   [] Dispense:  [] Dispense:    [] Headgear () / 1 per 3 months [x] Headgear () / 1 per 3 months   [] Replacement Nasal Cushion ()/2 per month [x] Interface Replacement ()/1 per month   [] Replacement Nasal Pillows ()/2 per month         Tubing: [x] Heated ()/1 per 3 months    [] Standard ()/1 per 3 months [] Other:           Filters: [x] Non-disposable ()/1 per 6 months     [x] Ultra-Fine, Disposable ()/2 per month        Miscellaneous: [] Chin Strap ()/ 1 per 6 months [] O2 bleed-in:       LPM   [] Oximetry on CPAP/Bilevel []  Other:    [x] Modem: ()         Start Order Date: 23    MEDICAL JUSTIFICATION:  I, the undersigned, certify that the above prescribed supplies are medically necessary for this patients wellbeing.   In my opinion, the supplies are both reasonable and necessary in reference to accepted standards of

## 2023-03-29 VITALS
HEART RATE: 60 BPM | RESPIRATION RATE: 8 BRPM | DIASTOLIC BLOOD PRESSURE: 62 MMHG | OXYGEN SATURATION: 100 % | TEMPERATURE: 98.5 F | SYSTOLIC BLOOD PRESSURE: 104 MMHG

## 2023-03-29 VITALS
HEIGHT: 66 IN | BODY MASS INDEX: 47.09 KG/M2 | DIASTOLIC BLOOD PRESSURE: 47 MMHG | RESPIRATION RATE: 16 BRPM | SYSTOLIC BLOOD PRESSURE: 114 MMHG | HEART RATE: 68 BPM | OXYGEN SATURATION: 100 % | WEIGHT: 293 LBS | TEMPERATURE: 98.2 F

## 2023-03-29 DIAGNOSIS — I47.1 PAROXYSMAL SUPRAVENTRICULAR TACHYCARDIA (HCC): Primary | ICD-10-CM

## 2023-03-29 LAB
BILIRUB UR QL STRIP.AUTO: NEGATIVE
CLARITY UR: CLEAR
COLOR UR: YELLOW
EKG ATRIAL RATE: 68 BPM
EKG ATRIAL RATE: 73 BPM
EKG DIAGNOSIS: NORMAL
EKG DIAGNOSIS: NORMAL
EKG P AXIS: 35 DEGREES
EKG P AXIS: 42 DEGREES
EKG P-R INTERVAL: 172 MS
EKG P-R INTERVAL: 182 MS
EKG Q-T INTERVAL: 350 MS
EKG Q-T INTERVAL: 388 MS
EKG QRS DURATION: 86 MS
EKG QRS DURATION: 92 MS
EKG QTC CALCULATION (BAZETT): 385 MS
EKG QTC CALCULATION (BAZETT): 412 MS
EKG R AXIS: 37 DEGREES
EKG R AXIS: 67 DEGREES
EKG T AXIS: -12 DEGREES
EKG T AXIS: 11 DEGREES
EKG VENTRICULAR RATE: 68 BPM
EKG VENTRICULAR RATE: 73 BPM
EPI CELLS #/AREA URNS HPF: ABNORMAL /HPF (ref 0–5)
GLUCOSE UR STRIP.AUTO-MCNC: NEGATIVE MG/DL
HCG UR QL: NEGATIVE
HGB UR QL STRIP.AUTO: ABNORMAL
KETONES UR STRIP.AUTO-MCNC: 15 MG/DL
LEUKOCYTE ESTERASE UR QL STRIP.AUTO: ABNORMAL
MUCOUS THREADS #/AREA URNS LPF: ABNORMAL /LPF
NITRITE UR QL STRIP.AUTO: NEGATIVE
PH UR STRIP.AUTO: 6 [PH] (ref 5–8)
PROT UR STRIP.AUTO-MCNC: ABNORMAL MG/DL
RBC #/AREA URNS HPF: ABNORMAL /HPF (ref 0–4)
SP GR UR STRIP.AUTO: 1.02 (ref 1–1.03)
UA DIPSTICK W REFLEX MICRO PNL UR: YES
URN SPEC COLLECT METH UR: ABNORMAL
UROBILINOGEN UR STRIP-ACNC: 1 E.U./DL
WBC #/AREA URNS HPF: ABNORMAL /HPF (ref 0–5)

## 2023-03-29 PROCEDURE — 84703 CHORIONIC GONADOTROPIN ASSAY: CPT

## 2023-03-29 PROCEDURE — 6370000000 HC RX 637 (ALT 250 FOR IP): Performed by: EMERGENCY MEDICINE

## 2023-03-29 PROCEDURE — 6360000002 HC RX W HCPCS: Performed by: EMERGENCY MEDICINE

## 2023-03-29 PROCEDURE — 99283 EMERGENCY DEPT VISIT LOW MDM: CPT

## 2023-03-29 PROCEDURE — 96365 THER/PROPH/DIAG IV INF INIT: CPT

## 2023-03-29 PROCEDURE — 93005 ELECTROCARDIOGRAM TRACING: CPT | Performed by: EMERGENCY MEDICINE

## 2023-03-29 PROCEDURE — 81003 URINALYSIS AUTO W/O SCOPE: CPT

## 2023-03-29 RX ADMIN — POTASSIUM CHLORIDE 40 MEQ: 1500 TABLET, EXTENDED RELEASE ORAL at 00:33

## 2023-03-29 RX ADMIN — MAGNESIUM SULFATE HEPTAHYDRATE 1000 MG: 1 INJECTION, SOLUTION INTRAVENOUS at 00:34

## 2023-03-29 ASSESSMENT — ENCOUNTER SYMPTOMS
GASTROINTESTINAL NEGATIVE: 1
EYES NEGATIVE: 1
RESPIRATORY NEGATIVE: 1

## 2023-03-29 NOTE — DISCHARGE INSTRUCTIONS
Your testing in the emergency department was reassuring. Your potassium and magnesium levels were borderline low, so you were given additional dose of these in the emergency department.   Continue with your scheduled cardiac ablation with your electrophysiologist.

## 2023-03-29 NOTE — ED PROVIDER NOTES
were significant for potassium of 3.5 and magnesium of 1.7. She was written for replacements of both of these. During her time in the emerge from it, she did have a brief episode of SVT that abated spontaneously prior to an EKG being obtained. She was observed for further amount of time and had no additional episodes of dysrhythmia. I do feel the patient is stable for discharge home. She will be instructed to continue her beta-blockers and follow-up with her electrophysiologist as scheduled. Is this patient to be included in the SEP-1 core measure due to severe sepsis or septic shock? No Exclusion criteria - the patient is NOT to be included for SEP-1 Core Measure due to: Infection is not suspected    Medical Decision Making  Problems Addressed:  Paroxysmal supraventricular tachycardia ([address]): acute illness or injury    Amount and/or Complexity of Data Reviewed  External Data Reviewed: ECG and notes. Labs: ordered. Decision-making details documented in ED Course. Radiology: ordered. Decision-making details documented in ED Course. ECG/medicine tests: ordered and independent interpretation performed. Decision-making details documented in ED Course. Risk  OTC drugs. Prescription drug management. Clinical Impression     1.  Paroxysmal supraventricular tachycardia ([address]        Disposition     PATIENT REFERRED TO:  Shiloh Knox MD  [address]  [phone]          [facility]          DISCHARGE MEDICATIONS:  Current Discharge Medication List          DISPOSITION Discharge - Pending Orders Complete 03/29/2023 01:00:19 AM        Diagnostic Results and Other Data       RADIOLOGY:  XR CHEST (2 VW)   Final Result      Normal.          LABS:   Results for orders placed or performed during the hospital encounter of 03/28/23   COVID-19, Rapid    Specimen: Nasopharyngeal Swab   Result Value Ref Range    SARS-CoV-2, NAAT Not Detected Not Detected Musculoskeletal:         General: No swelling. Normal range of motion. Cervical back: Normal range of motion and neck supple. Skin:     General: Skin is warm and dry. Neurological:      General: No focal deficit present. Mental Status: She is alert and oriented to person, place, and time. Cranial Nerves: No cranial nerve deficit. Motor: No weakness.       Coordination: Coordination normal.                  Kelton Thornton MD  03/29/23 7178

## 2023-03-29 NOTE — ED PROVIDER NOTES
believe anxiety is a component of her etiology. Patient requested to be admitted to the hospital but she has no indications at this time for inpatient management. Patient will be instructed to continue with her planned ablation in 2 days. Is this patient to be included in the SEP-1 core measure due to severe sepsis or septic shock? No Exclusion criteria - the patient is NOT to be included for SEP-1 Core Measure due to: Infection is not suspected    Medical Decision Making  Problems Addressed:  Paroxysmal supraventricular tachycardia ([address]): chronic illness or injury    Amount and/or Complexity of Data Reviewed  External Data Reviewed: ECG and notes. ECG/medicine tests: ordered and independent interpretation performed. Decision-making details documented in ED Course. Risk  Decision regarding hospitalization. Clinical Impression     1. Paroxysmal supraventricular tachycardia ([address]        Disposition     PATIENT REFERRED TO:  Shiloh Knox MD  Formerly [facility] [address]          DISCHARGE MEDICATIONS:  Current Discharge Medication List          DISPOSITION Decision To Discharge 03/29/2023 06:05:51 AM        Diagnostic Results and Other Data       RADIOLOGY:  No orders to display       LABS:   Results for orders placed or performed during the hospital encounter of 03/29/23   EKG 12 Lead   Result Value Ref Range    Ventricular Rate 68 BPM    Atrial Rate 68 BPM    P-R Interval 182 ms    QRS Duration 86 ms    Q-T Interval 388 ms    QTc Calculation (Bazett) 412 ms    P Axis 35 degrees    R Axis 37 degrees    T Axis 11 degrees    Diagnosis       EKG performed in ER and to be interpreted by ER physician. Confirmed by MD, ER ([id number]),  Jovon Echols ([id number]) on 3/29/2023 5:42:57 AM     EKG   EKG as interpreted by me shows patient to be in a normal sinus rhythm with a normal axis, normal RI and QT intervals, normal QRS duration, no ST segment abnormalities, isolated T wave inversion in lead III. This is essentially unchanged from EKG earlier tonight. ED BEDSIDE ULTRASOUND:  No results found. MOST RECENT VITALS:  BP: 106/74,Temp: 98.5 °F (36.9 °C), Heart Rate: 57, Resp: 11, SpO2: 100 %     Procedures     N/A    ED Course     Nursing Notes, Past Medical Hx, Past Surgical Hx, Social Hx,Allergies, and Family Hx were reviewed. The patient was given the following medications:  No orders of the defined types were placed in this encounter. CONSULTS:  None    Review of Systems     Review of Systems   Constitutional: Negative. HENT: Negative. Eyes: Negative. Respiratory: Negative. Cardiovascular:  Positive for palpitations. Gastrointestinal: Negative. Genitourinary: Negative. Musculoskeletal: Negative. Neurological: Negative. All other systems reviewed and are negative. Past Medical, Surgical, Family, and Social History     She has a past medical history of Class 3 severe obesity due to excess calories without serious comorbidity with body mass index (BMI) of 50.0 to 59.9 in [facility]), Hypertension, Prediabetes, SVT (supraventricular tachycardia) ([address] and TIA (transient ischemic attack). She has no past surgical history on file. Her family history includes Sleep Apnea in her brother. She reports that she quit smoking about 2 years ago. Her smoking use included cigarettes. She has a 0.40 pack-year smoking history. She has never used smokeless tobacco. She reports that she does not drink alcohol and does not use drugs.     Medications     Current Discharge Medication List        CONTINUE these medications which have NOT CHANGED    Details   omeprazole (PRILOSEC) 20 MG delayed release capsule Take 1 capsule by mouth every morning (before breakfast)  Qty: 30 capsule, Refills: 0      metoprolol tartrate (LOPRESSOR) 50 MG tablet Take 1 tablet by mouth 2 times daily  Qty: 60 tablet, Refills: 5      Dulaglutide (TRULICITY SC)

## 2023-03-29 NOTE — DISCHARGE INSTRUCTIONS
Continue your current medications. Continue with your planned ablation in 2 days. Follow-up with your electrophysiologist to determine any change in medications.

## 2023-03-30 ENCOUNTER — APPOINTMENT (OUTPATIENT)
Dept: GENERAL RADIOLOGY | Age: 28
End: 2023-03-30
Payer: MEDICAID

## 2023-03-30 ENCOUNTER — HOSPITAL ENCOUNTER (EMERGENCY)
Age: 28
Discharge: HOME | End: 2023-03-30
Attending: EMERGENCY MEDICINE
Payer: MEDICAID

## 2023-03-30 VITALS
HEART RATE: 56 BPM | DIASTOLIC BLOOD PRESSURE: 86 MMHG | BODY MASS INDEX: 47.09 KG/M2 | TEMPERATURE: 98.2 F | SYSTOLIC BLOOD PRESSURE: 154 MMHG | WEIGHT: 293 LBS | OXYGEN SATURATION: 100 % | RESPIRATION RATE: 19 BRPM | HEIGHT: 66 IN

## 2023-03-30 DIAGNOSIS — R07.9 CHEST PAIN, UNSPECIFIED TYPE: Primary | ICD-10-CM

## 2023-03-30 LAB
ALBUMIN SERPL-MCNC: 4 G/DL (ref 3.4–5)
ALBUMIN/GLOB SERPL: 1.2 {RATIO} (ref 1.1–2.2)
ALP SERPL-CCNC: 83 U/L (ref 40–129)
ALT SERPL-CCNC: 20 U/L (ref 10–40)
ANION GAP SERPL CALCULATED.3IONS-SCNC: 8 MMOL/L (ref 3–16)
AST SERPL-CCNC: 16 U/L (ref 15–37)
BILIRUB SERPL-MCNC: <0.2 MG/DL (ref 0–1)
BUN SERPL-MCNC: 13 MG/DL (ref 7–20)
CALCIUM SERPL-MCNC: 9.4 MG/DL (ref 8.3–10.6)
CHLORIDE SERPL-SCNC: 100 MMOL/L (ref 99–110)
CO2 SERPL-SCNC: 28 MMOL/L (ref 21–32)
CREAT SERPL-MCNC: 0.9 MG/DL (ref 0.6–1.1)
D DIMER: 0.29 UG/ML FEU (ref 0–0.6)
DEPRECATED RDW RBC AUTO: 14 % (ref 12.4–15.4)
EKG ATRIAL RATE: 59 BPM
EKG DIAGNOSIS: NORMAL
EKG P AXIS: 53 DEGREES
EKG P-R INTERVAL: 168 MS
EKG Q-T INTERVAL: 390 MS
EKG QRS DURATION: 90 MS
EKG QTC CALCULATION (BAZETT): 386 MS
EKG R AXIS: 87 DEGREES
EKG T AXIS: 5 DEGREES
EKG VENTRICULAR RATE: 59 BPM
GFR SERPLBLD CREATININE-BSD FMLA CKD-EPI: >60 ML/MIN/{1.73_M2}
GLUCOSE SERPL-MCNC: 111 MG/DL (ref 70–99)
HCT VFR BLD AUTO: 36.3 % (ref 36–48)
HGB BLD-MCNC: 12.2 G/DL (ref 12–16)
MAGNESIUM SERPL-MCNC: 1.9 MG/DL (ref 1.8–2.4)
MCH RBC QN AUTO: 26.9 PG (ref 26–34)
MCHC RBC AUTO-ENTMCNC: 33.5 G/DL (ref 31–36)
MCV RBC AUTO: 80.3 FL (ref 80–100)
PLATELET # BLD AUTO: 322 K/UL (ref 135–450)
PMV BLD AUTO: 8.6 FL (ref 5–10.5)
POTASSIUM SERPL-SCNC: 4.1 MMOL/L (ref 3.5–5.1)
PROT SERPL-MCNC: 7.3 G/DL (ref 6.4–8.2)
RBC # BLD AUTO: 4.52 M/UL (ref 4–5.2)
SODIUM SERPL-SCNC: 136 MMOL/L (ref 136–145)
TROPONIN T SERPL-MCNC: <0.01 NG/ML
TROPONIN T SERPL-MCNC: <0.01 NG/ML
WBC # BLD AUTO: 8.8 K/UL (ref 4–11)

## 2023-03-30 PROCEDURE — 85379 FIBRIN DEGRADATION QUANT: CPT

## 2023-03-30 PROCEDURE — 2500000003 HC RX 250 WO HCPCS: Performed by: EMERGENCY MEDICINE

## 2023-03-30 PROCEDURE — 6370000000 HC RX 637 (ALT 250 FOR IP): Performed by: EMERGENCY MEDICINE

## 2023-03-30 PROCEDURE — A4216 STERILE WATER/SALINE, 10 ML: HCPCS | Performed by: EMERGENCY MEDICINE

## 2023-03-30 PROCEDURE — 84484 ASSAY OF TROPONIN QUANT: CPT

## 2023-03-30 PROCEDURE — 96374 THER/PROPH/DIAG INJ IV PUSH: CPT

## 2023-03-30 PROCEDURE — 6360000002 HC RX W HCPCS: Performed by: EMERGENCY MEDICINE

## 2023-03-30 PROCEDURE — 96375 TX/PRO/DX INJ NEW DRUG ADDON: CPT

## 2023-03-30 PROCEDURE — 71046 X-RAY EXAM CHEST 2 VIEWS: CPT

## 2023-03-30 PROCEDURE — 2580000003 HC RX 258: Performed by: EMERGENCY MEDICINE

## 2023-03-30 PROCEDURE — 99285 EMERGENCY DEPT VISIT HI MDM: CPT

## 2023-03-30 PROCEDURE — 83735 ASSAY OF MAGNESIUM: CPT

## 2023-03-30 PROCEDURE — 80053 COMPREHEN METABOLIC PANEL: CPT

## 2023-03-30 PROCEDURE — 85027 COMPLETE CBC AUTOMATED: CPT

## 2023-03-30 PROCEDURE — 93005 ELECTROCARDIOGRAM TRACING: CPT | Performed by: EMERGENCY MEDICINE

## 2023-03-30 RX ORDER — MAGNESIUM HYDROXIDE/ALUMINUM HYDROXICE/SIMETHICONE 120; 1200; 1200 MG/30ML; MG/30ML; MG/30ML
30 SUSPENSION ORAL ONCE
Status: COMPLETED | OUTPATIENT
Start: 2023-03-30 | End: 2023-03-30

## 2023-03-30 RX ORDER — KETOROLAC TROMETHAMINE 30 MG/ML
15 INJECTION, SOLUTION INTRAMUSCULAR; INTRAVENOUS ONCE
Status: COMPLETED | OUTPATIENT
Start: 2023-03-30 | End: 2023-03-30

## 2023-03-30 RX ADMIN — FAMOTIDINE 20 MG: 10 INJECTION, SOLUTION INTRAVENOUS at 01:48

## 2023-03-30 RX ADMIN — ALUMINUM HYDROXIDE, MAGNESIUM HYDROXIDE, AND SIMETHICONE 30 ML: 200; 200; 20 SUSPENSION ORAL at 01:45

## 2023-03-30 RX ADMIN — KETOROLAC TROMETHAMINE 15 MG: 30 INJECTION, SOLUTION INTRAMUSCULAR at 01:46

## 2023-03-30 ASSESSMENT — PAIN DESCRIPTION - DESCRIPTORS
DESCRIPTORS: SHARP
DESCRIPTORS: SHARP

## 2023-03-30 ASSESSMENT — ENCOUNTER SYMPTOMS
SHORTNESS OF BREATH: 0
WHEEZING: 0
DIARRHEA: 0
ABDOMINAL PAIN: 0
EYE PAIN: 0
COUGH: 0
NAUSEA: 0
VOMITING: 0

## 2023-03-30 ASSESSMENT — PAIN - FUNCTIONAL ASSESSMENT
PAIN_FUNCTIONAL_ASSESSMENT: 0-10
PAIN_FUNCTIONAL_ASSESSMENT: NONE - DENIES PAIN

## 2023-03-30 ASSESSMENT — PAIN DESCRIPTION - LOCATION
LOCATION: CHEST
LOCATION: CHEST

## 2023-03-30 ASSESSMENT — PAIN SCALES - GENERAL: PAINLEVEL_OUTOF10: 7

## 2023-03-30 ASSESSMENT — PAIN DESCRIPTION - ORIENTATION
ORIENTATION: UPPER
ORIENTATION: UPPER

## 2023-03-30 NOTE — ED PROVIDER NOTES
ordered. ECG/medicine tests: ordered. Risk  OTC drugs. Prescription drug management. Clinical Impression     1. Chest pain, unspecified type        Disposition     PATIENT REFERRED TO:  [facility]          DISCHARGE MEDICATIONS:  New Prescriptions    No medications on file       DISPOSITION Decision To Discharge 03/30/2023 02:44:16 AM        Diagnostic Results and Other Data     RADIOLOGY:  XR CHEST (2 VW)   Final Result   1. No evidence of acute cardiopulmonary disease. LABS:   Results for orders placed or performed during the hospital encounter of 03/30/23   CBC   Result Value Ref Range    WBC 8.8 4.0 - 11.0 K/uL    RBC 4.52 4.00 - 5.20 M/uL    Hemoglobin 12.2 12.0 - 16.0 g/dL    Hematocrit 36.3 36.0 - 48.0 %    MCV 80.3 80.0 - 100.0 fL    MCH 26.9 26.0 - 34.0 pg    MCHC 33.5 31.0 - 36.0 g/dL    RDW 14.0 12.4 - 15.4 %    Platelets 347 180 - 403 K/uL    MPV 8.6 5.0 - 10.5 fL   CMP w/ Reflex to MG   Result Value Ref Range    Sodium 136 136 - 145 mmol/L    Potassium reflex Magnesium 4.1 3.5 - 5.1 mmol/L    Chloride 100 99 - 110 mmol/L    CO2 28 21 - 32 mmol/L    Anion Gap 8 3 - 16    Glucose 111 (H) 70 - 99 mg/dL    BUN 13 7 - 20 mg/dL    Creatinine 0.9 0.6 - 1.1 mg/dL    Est, Glom Filt Rate >60 >60    Calcium 9.4 8.3 - 10.6 mg/dL    Total Protein 7.3 6.4 - 8.2 g/dL    Albumin 4.0 3.4 - 5.0 g/dL    Albumin/Globulin Ratio 1.2 1.1 - 2.2    Total Bilirubin <0.2 0.0 - 1.0 mg/dL    Alkaline Phosphatase 83 40 - 129 U/L    ALT 20 10 - 40 U/L    AST 16 15 - 37 U/L   Troponin   Result Value Ref Range    Troponin <0.01 <0.01 ng/mL   Troponin   Result Value Ref Range    Troponin <0.01 <0.01 ng/mL   Magnesium   Result Value Ref Range    Magnesium 1.90 1.80 - 2.40 mg/dL   D-Dimer, Quantitative   Result Value Ref Range    D-Dimer, Quant 0.29 0.00 - 0.60 ug/mL FEU     EKG   NSR. Normal axes or intervals.  Isolated TWI in lead III with T wave flattening laterally which is unchanged from prior EKG dated 3/29/23. ED BEDSIDE ULTRASOUND:  No results found. MOST RECENT VITALS:  BP: (!) 154/86,Temp: 98.2 °F (36.8 °C), Heart Rate: 56, Resp: 19, SpO2: 100 %     Procedures         ED Course     Nursing Notes, Past Medical Hx, Past Surgical Hx, Social Hx,Allergies, and Family Hx were reviewed. The patient was given the following medications:  Orders Placed This Encounter   Medications    aluminum & magnesium hydroxide-simethicone (MAALOX) 200-200-20 MG/5ML suspension 30 mL    famotidine (PEPCID) 20 mg in sodium chloride (PF) 0.9 % 10 mL injection    ketorolac (TORADOL) injection 15 mg       CONSULTS:  None    Review of Systems     Review of Systems   Constitutional:  Negative for chills and fever. HENT:  Negative for congestion. Eyes:  Negative for pain. Respiratory:  Negative for cough, shortness of breath and wheezing. Cardiovascular:  Positive for chest pain. Negative for leg swelling. Gastrointestinal:  Negative for abdominal pain, diarrhea, nausea and vomiting. Genitourinary:  Negative for dysuria. Musculoskeletal:  Negative for arthralgias. Skin:  Negative for rash. Neurological:  Negative for weakness and headaches. All other systems reviewed and are negative. Past Medical, Surgical, Family, and Social History     She has a past medical history of Class 3 severe obesity due to excess calories without serious comorbidity with body mass index (BMI) of 50.0 to 59.9 in adult [facility]), Hypertension, Prediabetes, SVT (supraventricular tachycardia) ([facility] and TIA (transient ischemic attack). She has no past surgical history on file. Her family history includes Sleep Apnea in her brother. She reports that she quit smoking about 2 years ago. Her smoking use included cigarettes. She has a 0.40 pack-year smoking history. She has never used smokeless tobacco. She reports that she does not drink alcohol and does not use drugs.     Medications     Previous Medications    ASPIRIN 81 MG

## 2023-03-31 ENCOUNTER — HOSPITAL ENCOUNTER (OUTPATIENT)
Dept: CARDIAC CATH/INVASIVE PROCEDURES | Age: 28
End: 2023-03-31
Payer: MEDICAID

## 2023-03-31 ENCOUNTER — ANESTHESIA EVENT (OUTPATIENT)
Dept: CARDIAC CATH/INVASIVE PROCEDURES | Age: 28
End: 2023-03-31

## 2023-03-31 ENCOUNTER — ANESTHESIA (OUTPATIENT)
Dept: CARDIAC CATH/INVASIVE PROCEDURES | Age: 28
End: 2023-03-31

## 2023-03-31 VITALS — HEIGHT: 66 IN | BODY MASS INDEX: 47.09 KG/M2 | WEIGHT: 293 LBS | TEMPERATURE: 98.2 F

## 2023-03-31 PROBLEM — I47.1 AVNRT (AV NODAL RE-ENTRY TACHYCARDIA) (HCC): Status: ACTIVE | Noted: 2023-03-31

## 2023-03-31 PROBLEM — I47.19 AVNRT (AV NODAL RE-ENTRY TACHYCARDIA): Status: ACTIVE | Noted: 2023-03-31

## 2023-03-31 LAB
EKG ATRIAL RATE: 57 BPM
EKG DIAGNOSIS: NORMAL
EKG P AXIS: 27 DEGREES
EKG P-R INTERVAL: 164 MS
EKG Q-T INTERVAL: 396 MS
EKG QRS DURATION: 80 MS
EKG QTC CALCULATION (BAZETT): 385 MS
EKG R AXIS: 51 DEGREES
EKG T AXIS: 3 DEGREES
EKG VENTRICULAR RATE: 57 BPM
HCG UR QL: NEGATIVE

## 2023-03-31 PROCEDURE — C1730 CATH, EP, 19 OR FEW ELECT: HCPCS

## 2023-03-31 PROCEDURE — 93005 ELECTROCARDIOGRAM TRACING: CPT | Performed by: INTERNAL MEDICINE

## 2023-03-31 PROCEDURE — 2500000003 HC RX 250 WO HCPCS: Performed by: NURSE ANESTHETIST, CERTIFIED REGISTERED

## 2023-03-31 PROCEDURE — 6360000002 HC RX W HCPCS: Performed by: NURSE ANESTHETIST, CERTIFIED REGISTERED

## 2023-03-31 PROCEDURE — 84703 CHORIONIC GONADOTROPIN ASSAY: CPT

## 2023-03-31 PROCEDURE — 93653 COMPRE EP EVAL TX SVT: CPT | Performed by: INTERNAL MEDICINE

## 2023-03-31 PROCEDURE — C1732 CATH, EP, DIAG/ABL, 3D/VECT: HCPCS

## 2023-03-31 PROCEDURE — 93010 ELECTROCARDIOGRAM REPORT: CPT | Performed by: INTERNAL MEDICINE

## 2023-03-31 PROCEDURE — 3700000001 HC ADD 15 MINUTES (ANESTHESIA)

## 2023-03-31 PROCEDURE — C1766 INTRO/SHEATH,STRBLE,NON-PEEL: HCPCS

## 2023-03-31 PROCEDURE — 93662 INTRACARDIAC ECG (ICE): CPT

## 2023-03-31 PROCEDURE — C1894 INTRO/SHEATH, NON-LASER: HCPCS

## 2023-03-31 PROCEDURE — 2580000003 HC RX 258: Performed by: NURSE ANESTHETIST, CERTIFIED REGISTERED

## 2023-03-31 PROCEDURE — 93623 PRGRMD STIMJ&PACG IV RX NFS: CPT | Performed by: INTERNAL MEDICINE

## 2023-03-31 PROCEDURE — 93622 COMP EP EVAL L VENTR PAC&REC: CPT

## 2023-03-31 PROCEDURE — 93653 COMPRE EP EVAL TX SVT: CPT

## 2023-03-31 PROCEDURE — 2709999900 HC NON-CHARGEABLE SUPPLY

## 2023-03-31 PROCEDURE — 3700000000 HC ANESTHESIA ATTENDED CARE

## 2023-03-31 RX ORDER — PROPOFOL 10 MG/ML
INJECTION, EMULSION INTRAVENOUS CONTINUOUS PRN
Status: DISCONTINUED | OUTPATIENT
Start: 2023-03-31 | End: 2023-03-31 | Stop reason: SDUPTHER

## 2023-03-31 RX ORDER — HEPARIN SODIUM 1000 [USP'U]/ML
INJECTION, SOLUTION INTRAVENOUS; SUBCUTANEOUS PRN
Status: DISCONTINUED | OUTPATIENT
Start: 2023-03-31 | End: 2023-03-31 | Stop reason: SDUPTHER

## 2023-03-31 RX ORDER — FENTANYL CITRATE 50 UG/ML
INJECTION, SOLUTION INTRAMUSCULAR; INTRAVENOUS PRN
Status: DISCONTINUED | OUTPATIENT
Start: 2023-03-31 | End: 2023-03-31 | Stop reason: SDUPTHER

## 2023-03-31 RX ORDER — SODIUM CHLORIDE, SODIUM LACTATE, POTASSIUM CHLORIDE, CALCIUM CHLORIDE 600; 310; 30; 20 MG/100ML; MG/100ML; MG/100ML; MG/100ML
INJECTION, SOLUTION INTRAVENOUS CONTINUOUS PRN
Status: DISCONTINUED | OUTPATIENT
Start: 2023-03-31 | End: 2023-03-31 | Stop reason: SDUPTHER

## 2023-03-31 RX ORDER — MIDAZOLAM HYDROCHLORIDE 1 MG/ML
INJECTION INTRAMUSCULAR; INTRAVENOUS PRN
Status: DISCONTINUED | OUTPATIENT
Start: 2023-03-31 | End: 2023-03-31 | Stop reason: SDUPTHER

## 2023-03-31 RX ADMIN — FENTANYL CITRATE 25 MCG: 50 INJECTION, SOLUTION INTRAMUSCULAR; INTRAVENOUS at 13:33

## 2023-03-31 RX ADMIN — PROPOFOL 10 MG: 10 INJECTION, EMULSION INTRAVENOUS at 14:35

## 2023-03-31 RX ADMIN — MIDAZOLAM HYDROCHLORIDE 2 MG: 2 INJECTION, SOLUTION INTRAMUSCULAR; INTRAVENOUS at 13:17

## 2023-03-31 RX ADMIN — MIDAZOLAM HYDROCHLORIDE 2 MG: 2 INJECTION, SOLUTION INTRAMUSCULAR; INTRAVENOUS at 14:30

## 2023-03-31 RX ADMIN — FENTANYL CITRATE 25 MCG: 50 INJECTION, SOLUTION INTRAMUSCULAR; INTRAVENOUS at 13:39

## 2023-03-31 RX ADMIN — HEPARIN SODIUM 3000 UNITS: 1000 INJECTION INTRAVENOUS; SUBCUTANEOUS at 13:13

## 2023-03-31 RX ADMIN — SODIUM CHLORIDE, SODIUM LACTATE, POTASSIUM CHLORIDE, AND CALCIUM CHLORIDE: .6; .31; .03; .02 INJECTION, SOLUTION INTRAVENOUS at 13:06

## 2023-03-31 RX ADMIN — FENTANYL CITRATE 25 MCG: 50 INJECTION, SOLUTION INTRAMUSCULAR; INTRAVENOUS at 13:18

## 2023-03-31 RX ADMIN — FENTANYL CITRATE 25 MCG: 50 INJECTION, SOLUTION INTRAMUSCULAR; INTRAVENOUS at 13:08

## 2023-03-31 RX ADMIN — PROPOFOL 80 MCG/KG/MIN: 10 INJECTION, EMULSION INTRAVENOUS at 13:33

## 2023-03-31 RX ADMIN — PROPOFOL 25 MG: 10 INJECTION, EMULSION INTRAVENOUS at 14:30

## 2023-03-31 RX ADMIN — ISOPROTERENOL HYDROCHLORIDE 2 MCG/MIN: 0.2 INJECTION, SOLUTION INTRAMUSCULAR; INTRAVENOUS at 14:16

## 2023-03-31 NOTE — INTERVAL H&P NOTE
Update History & Physical    The patient's History and Physical of March 15, by me was reviewed with the patient and I examined the patient. There was no change. The surgical site was confirmed by the patient and me. Plan: The risks, benefits, expected outcome, and alternative to the recommended procedure have been discussed with the patient. Patient understands and wants to proceed with the procedure.      Electronically signed by Jeff Guzman MD on 3/31/2023 at 1:36 PM

## 2023-03-31 NOTE — ANESTHESIA POSTPROCEDURE EVALUATION
Department of Anesthesiology  Postprocedure Note    Patient: Katlin Hollins  MRN: [id number]  YOB: 1995  Date of evaluation: 3/31/2023      Procedure Summary     Date: 03/31/23 Room / Location: [facility] Cath Lab    Anesthesia Start: 9395 Anesthesia Stop: 5657    Procedure: CATH LAB WITH ANESTHESIA Diagnosis:     Scheduled Providers: Myra Robb MD; IGNACIO White - CRNA Responsible Provider: Myra Robb MD    Anesthesia Type: general ASA Status: 3          Anesthesia Type: No value filed.     Blanca Phase I:      Blanca Phase II:        Anesthesia Post Evaluation    Patient location during evaluation: PACU  Patient participation: complete - patient participated  Level of consciousness: awake and alert  Airway patency: patent  Nausea & Vomiting: no nausea and no vomiting  Cardiovascular status: blood pressure returned to baseline  Respiratory status: acceptable  Hydration status: euvolemic

## 2023-03-31 NOTE — DISCHARGE INSTRUCTIONS
The catheter site becomes red, or has pus or foul-smelling drainage coming from it. This may mean it is infected. You have increasing pain at the catheter site. (It is normal to have some soreness, but this should get better, not worse.)    You have questions or concerns about your ablation, medicines, or health condition. SEEK CARE IMMEDIATELY IF:   Your leg or arm becomes numb (loses feeling), is very painful, or changes color. The bruise at the catheter site starts to get bigger or the area has new swelling. If you have any of the following, it is an emergency ! Call 911 or 0 () to get to the nearest hospital or clinic. Do not drive yourself! The ablation catheter site is bleeding a lot or you cannot stop the bleeding. You have signs of a stroke (brain attack). Having new weakness or trouble moving one side of your face or body may be signs of a stroke. Other signs include new trouble thinking or speaking clearly, and new changes in vision. Medicines:   Keep a list of your medicines: Keep a written list of the medicines you take, the amounts, and when and why you take them. Bring the list of your medicines or the pill bottles when you see your caregivers. Do not take any medicines, over-the-counter drugs, vitamins, herbs, or food supplements without first talking to caregivers. Take your medicine as directed: Always take your medicine as directed by caregivers. Call your caregiver if you think your medicines are not helping or if you feel you are having side effects. Do not quit taking your medicines until you discuss it with your caregiver. Aspirin or blood thinners: Your caregiver may want you to take aspirin or another blood thinner for 2 to 4 weeks after your ablation. This is to keep blood clots from forming in your heart. Do not take more or less medicine than caregivers say to take.  If caregivers tell you to take aspirin, do not take acetaminophen or ibuprofen instead. Over-the-counter pain medicine: You may use over-the-counter (OTC) pain medicines, such as ibuprofen or acetaminophen, for pain or soreness. These medicines are safe for most people to use. However, they can cause serious problems when they are not used correctly. People with certain medical conditions, or using certain other medicines are at a higher risk for problems. Using too much, or using these medicines for longer than the label says can also cause problems. Follow directions on the label carefully. If you have questions, talk to your caregiver. Ask your caregiver when to return for a follow-up visit. Keep all appointments. Write down any questions you may have. This way you will remember to ask these questions during your next visit. FOLLOW-UP APPOINTMENTS    Follow-up with  NP in 1 month  [address] Phone: [phone]. If you are unable to make this appointment, please call to reschedule.

## 2023-03-31 NOTE — PROCEDURES
short sheath were introduced to the right femoral vein. Through the SRO sheath, we advanced Smart-Touch-Surround Flow catheter to the right atrium and then to enter the coronary sinus without fluoroscopy but utilizing the 3-D electroanatomic map. Fast anatomical mapping of the coronary sinus and right atrium was performed. Then, we advanced Biosense pantoja CS catheter to the coronary sinus for left atrial pacing and CRD 2 catheter to the bundle of His region and into the right ventricle. Biosense Pantoja SmartTouch-Surround Flow catheter was placed in the high right atrial position. After that, we did baseline measurements by pacing in atria, left atrium via coronary sinus and right ventricle and performed programmed stimulation. . Sinus cycle length was 693 msec  . NJ interval: 177 msec  . QRS duration: 70 msec  . QT interval: 320 msec  . A-H interval of 90 msec  . H-V interval of 52 msec  . 1:1 antegrade conduction over AV block (AV talia wenckebach cycle length) was 440 msec   . AV crossover 460  . Fast pathway ERP of 600/350 msec   . Valri Dykes 1:1 retrograde conduction over AV node (VA wenckebach cycle lenght) was 500 msec  . VA ERP of 600/290 msec     Arrhythmia Induction:  Patient was started on Isuprel up to 2 mcg/min. In spite of aggressive pacing protocol, on Isopril, she was not able to be induced on SVT. Rather, she was easily induced into atrial fibrillation which was self terminating. Patient have a documented twelve-lead EKG with a narrow QRS complex that was regular and with short RP interval.  Her clinical tachycardia was terminated by adenosine. In addition to this, she have clear evidence of slow pathway in the form of AV crossover, AH jump, at least 2 echo beats consistently while on Isopril. Hence, I decided to proceed with a slow pathway ablation. Mapping and Ablation:    Then three-dimensional mapping system (Carto navigation system)  was used and a 3D electroanatomical map of the transported to the holding area in stable condition. 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 PLAN:    Bed rest for two hours  From an EP perspective, if no further issues, the patient may be discharged home today after 2 hour bedrest and if the patient remains stable. Patient will receive usual post ablation care. Follow-up with Dr. Bhaskar Constantino in one months' time. Thank you for allowing me to participate in the care of your patient. If you have any questions, please do not hesitate to contact me.     Brian Carbajal MD   Cardiac Electrophysiology  [address] [place] [address] [phone]  [facility]

## 2023-03-31 NOTE — ANESTHESIA PRE PROCEDURE
83 03/30/2023 01:00 AM    AST 16 03/30/2023 01:00 AM    ALT 20 03/30/2023 01:00 AM       POC Tests: No results for input(s): POCGLU, POCNA, POCK, POCCL, POCBUN, POCHEMO, POCHCT in the last 72 hours. Coags: No results found for: PROTIME, INR, APTT    HCG (If Applicable):   Lab Results   Component Value Date    PREGTESTUR Negative 03/31/2023        ABGs: No results found for: PHART, PO2ART, SOJ1QXP, UUC7SEL, BEART, G5WPGKIC     Type & Screen (If Applicable):  No results found for: LABABO, LABRH    Drug/Infectious Status (If Applicable):  No results found for: HIV, HEPCAB    COVID-19 Screening (If Applicable):   Lab Results   Component Value Date/Time    COVID19 Not Detected 03/28/2023 10:16 PM           Anesthesia Evaluation  Patient summary reviewed and Nursing notes reviewed no history of anesthetic complications:   Airway: Mallampati: II  TM distance: >3 FB   Neck ROM: full  Mouth opening: > = 3 FB   Dental:          Pulmonary:                              Cardiovascular:    (+) hypertension:, dysrhythmias: SVT,                   Neuro/Psych:   (+) TIA,             GI/Hepatic/Renal:   (+) morbid obesity          Endo/Other:                     Abdominal:             Vascular: Other Findings:           Anesthesia Plan      general     ASA 1    (26-year-old female presents for SVT ablation. Plan general anesthesia with ASA standard monitors. Questions answered. Patient agreeable with anesthetic plan.  )  Induction: intravenous. Anesthetic plan and risks discussed with patient. Plan discussed with CRNA.     Attending anesthesiologist reviewed and agrees with Maricruz Monroe MD   3/31/2023

## 2023-04-01 ENCOUNTER — HOSPITAL ENCOUNTER (EMERGENCY)
Age: 28
Discharge: HOME | End: 2023-04-01
Attending: EMERGENCY MEDICINE
Payer: MEDICAID

## 2023-04-01 VITALS
OXYGEN SATURATION: 99 % | TEMPERATURE: 98 F | RESPIRATION RATE: 16 BRPM | DIASTOLIC BLOOD PRESSURE: 77 MMHG | WEIGHT: 293 LBS | BODY MASS INDEX: 54.12 KG/M2 | SYSTOLIC BLOOD PRESSURE: 118 MMHG | HEART RATE: 57 BPM

## 2023-04-01 DIAGNOSIS — G89.18 POST-OP PAIN: Primary | ICD-10-CM

## 2023-04-01 LAB
BASOPHILS # BLD: 0.1 K/UL (ref 0–0.2)
BASOPHILS NFR BLD: 0.8 %
DEPRECATED RDW RBC AUTO: 14.1 % (ref 12.4–15.4)
EOSINOPHIL # BLD: 0.1 K/UL (ref 0–0.6)
EOSINOPHIL NFR BLD: 1.1 %
HCT VFR BLD AUTO: 37.6 % (ref 36–48)
HGB BLD-MCNC: 12.1 G/DL (ref 12–16)
LYMPHOCYTES # BLD: 2.3 K/UL (ref 1–5.1)
LYMPHOCYTES NFR BLD: 27.9 %
MCH RBC QN AUTO: 26.5 PG (ref 26–34)
MCHC RBC AUTO-ENTMCNC: 32.1 G/DL (ref 31–36)
MCV RBC AUTO: 82.4 FL (ref 80–100)
MONOCYTES # BLD: 1 K/UL (ref 0–1.3)
MONOCYTES NFR BLD: 12.8 %
NEUTROPHILS # BLD: 4.6 K/UL (ref 1.7–7.7)
NEUTROPHILS NFR BLD: 57.4 %
PLATELET # BLD AUTO: 302 K/UL (ref 135–450)
PMV BLD AUTO: 8.4 FL (ref 5–10.5)
RBC # BLD AUTO: 4.56 M/UL (ref 4–5.2)
WBC # BLD AUTO: 8.1 K/UL (ref 4–11)

## 2023-04-01 PROCEDURE — 85025 COMPLETE CBC W/AUTO DIFF WBC: CPT

## 2023-04-01 PROCEDURE — 99283 EMERGENCY DEPT VISIT LOW MDM: CPT

## 2023-04-01 PROCEDURE — 36415 COLL VENOUS BLD VENIPUNCTURE: CPT

## 2023-04-01 PROCEDURE — 6370000000 HC RX 637 (ALT 250 FOR IP): Performed by: EMERGENCY MEDICINE

## 2023-04-01 RX ORDER — OXYCODONE HYDROCHLORIDE 5 MG/1
5 TABLET ORAL ONCE
Status: COMPLETED | OUTPATIENT
Start: 2023-04-01 | End: 2023-04-01

## 2023-04-01 RX ADMIN — OXYCODONE HYDROCHLORIDE 5 MG: 5 TABLET ORAL at 03:11

## 2023-04-01 ASSESSMENT — PAIN DESCRIPTION - LOCATION: LOCATION: GROIN

## 2023-04-01 ASSESSMENT — PAIN DESCRIPTION - ORIENTATION: ORIENTATION: RIGHT

## 2023-04-01 ASSESSMENT — PAIN DESCRIPTION - PAIN TYPE: TYPE: ACUTE PAIN

## 2023-04-01 ASSESSMENT — PAIN SCALES - GENERAL: PAINLEVEL_OUTOF10: 8

## 2023-04-01 ASSESSMENT — PAIN DESCRIPTION - DESCRIPTORS: DESCRIPTORS: STABBING

## 2023-04-01 ASSESSMENT — PAIN - FUNCTIONAL ASSESSMENT: PAIN_FUNCTIONAL_ASSESSMENT: 0-10

## 2023-04-01 ASSESSMENT — PAIN DESCRIPTION - FREQUENCY: FREQUENCY: CONTINUOUS

## 2023-04-01 NOTE — ED PROVIDER NOTES
2 times daily    OMEPRAZOLE (PRILOSEC) 20 MG DELAYED RELEASE CAPSULE    Take 1 capsule by mouth every morning (before breakfast)       Allergies     She is allergic to dilaudid [hydromorphone hcl]. Physical Exam     INITIAL VITALS:  ,  ,  ,  ,     Physical Exam    General:  Well developed adult female in no acute distress, able toconverse in full sentences  HEENT:  Normocephalic, atraumatic, PERRL, extra-ocular movements intact, oropharynx benign  Neck:  Supple, no lymphadenopathy, trachea midline, no masses  Pulmonary:   Clear to auscultation bilaterally, good air movement, no wheezes  Cardiac:  Regular rate and rhythm, no M/R/G  Abdomen:  Soft, non-tender, non-distended, no rebounding or guarding  Musculoskeletal:  2+ pulses, no edema or clubbing  Skin: Cardiac cath access site with no palpable hematoma, swelling or overlying erythema. No warmth. No lymphangitic streaking. Neuro: Alert and oriented X 4,able to move all four extremities with equal strength bilaterally, sensory exam grossly intact, cranial nerves II-XII intact  Psych:  Appropriate mood and affect    Diagnostic Results     EKG       RADIOLOGY:  No orders to display       LABS:   No results found for this visit on 04/01/23. ED BEDSIDE ULTRASOUND:      RECENT VITALS:   ,  ,  ,  ,       Procedures         ED Course     Nursing Notes, Past Medical Hx, Past Surgical Hx, Social Hx, Allergies, and Family Hx were reviewed. The patient was given the following medications:  No orders of the defined types were placed in this encounter. CONSULTS:  None    MEDICAL DECISION MAKING / ASSESSMENT / Tresa Laughlin is a 32 y.o. female with a history andpresentation as described above in HPI. The patient was evaluated by myself, the ED Attending Physician. On initial encounter the patient was in no acute distress with reassuring vitals and no signs of impending hemodynamic or respiratory collapse.     Patient presents the emergency admitted to ***.    *** At this time I am going off-service and will be signing out care of this patient to my colleague Dr. Ron Peters for further care. My colleague's responsibilities will include: ***    Medical Decision Making  Amount and/or Complexity of Data Reviewed  Labs: ordered. Risk  Prescription drug management. Clinical Impression     No diagnosis found. Disposition     PATIENT REFERREDTO:  No follow-up provider specified.     DISCHARGE MEDICATIONS:  New Prescriptions    No medications on file       DISPOSITION

## 2023-04-01 NOTE — ED NOTES
Pt discharged from ED in stable condition. Discharge instruction explain, all question answered. Pt walked to [facility] independently.        Nancy Levin RN  04/01/23 [id number]

## 2023-04-03 ENCOUNTER — TELEPHONE (OUTPATIENT)
Dept: BARIATRICS/WEIGHT MGMT | Age: 28
End: 2023-04-03

## 2023-04-03 NOTE — TELEPHONE ENCOUNTER
Called as a new pt courtesy call - left message. Told patient to have new pt paperwork completely filled out, insurance card, and id and to arrive on time at [facility] location. If they didn't have the paperwork filled out and arrive on time may be rescheduled. Also stated if they didn't receive paperwork to let us know so we could get it to them another way. Left office number on message.

## 2023-04-04 LAB — INR BLD: 1.2 (ref 0.88–1.12)

## 2023-05-24 ENCOUNTER — HOSPITAL ENCOUNTER (EMERGENCY)
Age: 28
Discharge: HOME | End: 2023-05-24
Attending: EMERGENCY MEDICINE
Payer: MEDICAID

## 2023-05-24 VITALS
SYSTOLIC BLOOD PRESSURE: 148 MMHG | HEIGHT: 66 IN | BODY MASS INDEX: 53.36 KG/M2 | TEMPERATURE: 98 F | RESPIRATION RATE: 16 BRPM | DIASTOLIC BLOOD PRESSURE: 95 MMHG | OXYGEN SATURATION: 100 % | HEART RATE: 64 BPM

## 2023-05-24 DIAGNOSIS — F41.1 ANXIETY STATE: Primary | ICD-10-CM

## 2023-05-24 PROCEDURE — 99283 EMERGENCY DEPT VISIT LOW MDM: CPT

## 2023-05-24 PROCEDURE — 6370000000 HC RX 637 (ALT 250 FOR IP): Performed by: EMERGENCY MEDICINE

## 2023-05-24 RX ORDER — HYDROXYZINE PAMOATE 25 MG/1
50 CAPSULE ORAL ONCE
Status: COMPLETED | OUTPATIENT
Start: 2023-05-24 | End: 2023-05-24

## 2023-05-24 RX ORDER — HYDROXYZINE HYDROCHLORIDE 25 MG/1
25 TABLET, FILM COATED ORAL 3 TIMES DAILY PRN
COMMUNITY

## 2023-05-24 RX ADMIN — HYDROXYZINE PAMOATE 50 MG: 25 CAPSULE ORAL at 05:21

## 2023-05-24 ASSESSMENT — LIFESTYLE VARIABLES
HOW MANY STANDARD DRINKS CONTAINING ALCOHOL DO YOU HAVE ON A TYPICAL DAY: PATIENT DOES NOT DRINK
HOW OFTEN DO YOU HAVE A DRINK CONTAINING ALCOHOL: NEVER

## 2023-05-24 ASSESSMENT — PAIN - FUNCTIONAL ASSESSMENT: PAIN_FUNCTIONAL_ASSESSMENT: NONE - DENIES PAIN

## 2023-05-24 NOTE — ED PROVIDER NOTES
[address]          ATTENDING PHYSICIAN NOTE       Date of evaluation: 5/24/2023    Chief Complaint     Anxiety (Pt takes hydroxyzine for anxiety and forget to get medication refilled.)      History of Present Illness     Paras Roe is a 32 y.o. female who presents ID. Patient states that she is on 2 different medications for anxiety. However she is out of both of these at this time and just needs to pick her refills up from the pharmacy. She sees a physician at the [facility] can clinic to prescribe these medications. She states that this evening she started becoming anxious and could not calm herself down. She is here requesting hydroxyzine. Cannot identify a specific trigger for her anxiety this evening    ASSESSMENT / PLAN  (MEDICAL DECISION MAKING)     INITIAL VITALS: BP: (!) 148/95, Temp: 98 °F (36.7 °C), Pulse: 64, Respirations: 16, SpO2: 100 %      Medical Decision Making  Is a 66-year-old female who presents due to anxiety. Patient is followed by primary care physician for this and has prescriptions waiting for her at the pharmacy to be picked up when they open. She does appear slightly anxious on exam without any respiratory difficulty. As she has not been hyperventilating I do not feel the need to check labs to look for electrolyte disturbances. Patient without any other symptoms to suggest underlying infectious process as the cause of her anxiety. No palpitations or chest pain to suggest this is due to an arrhythmia. She was given hydroxyzine with good resolution and is stable for discharge    Risk  Prescription drug management. Clinical Impression     1. Anxiety state        Disposition     PATIENT REFERRED TO:  No follow-up provider specified.     DISCHARGE MEDICATIONS:  Discharge Medication List as of 5/24/2023  5:22 AM          DISPOSITION Decision To Discharge 05/24/2023 05:15:26 AM        Diagnostic Results and Other Data

## 2023-06-10 ENCOUNTER — HOSPITAL ENCOUNTER (EMERGENCY)
Age: 28
Discharge: HOME | End: 2023-06-10
Attending: EMERGENCY MEDICINE
Payer: MEDICAID

## 2023-06-10 ENCOUNTER — APPOINTMENT (OUTPATIENT)
Dept: GENERAL RADIOLOGY | Age: 28
End: 2023-06-10
Payer: MEDICAID

## 2023-06-10 VITALS
RESPIRATION RATE: 16 BRPM | TEMPERATURE: 98.5 F | WEIGHT: 293 LBS | DIASTOLIC BLOOD PRESSURE: 69 MMHG | BODY MASS INDEX: 47.09 KG/M2 | HEIGHT: 66 IN | OXYGEN SATURATION: 100 % | SYSTOLIC BLOOD PRESSURE: 109 MMHG | HEART RATE: 67 BPM

## 2023-06-10 DIAGNOSIS — R07.9 CHEST PAIN, UNSPECIFIED TYPE: Primary | ICD-10-CM

## 2023-06-10 LAB
ALBUMIN SERPL-MCNC: 3.7 G/DL (ref 3.4–5)
ALBUMIN/GLOB SERPL: 0.9 {RATIO} (ref 1.1–2.2)
ALP SERPL-CCNC: 93 U/L (ref 40–129)
ALT SERPL-CCNC: 29 U/L (ref 10–40)
ANION GAP SERPL CALCULATED.3IONS-SCNC: 12 MMOL/L (ref 3–16)
AST SERPL-CCNC: 47 U/L (ref 15–37)
BASOPHILS # BLD: 0.1 K/UL (ref 0–0.2)
BASOPHILS NFR BLD: 2.2 %
BILIRUB SERPL-MCNC: <0.2 MG/DL (ref 0–1)
BUN SERPL-MCNC: 12 MG/DL (ref 7–20)
CALCIUM SERPL-MCNC: 8.8 MG/DL (ref 8.3–10.6)
CHLORIDE SERPL-SCNC: 103 MMOL/L (ref 99–110)
CO2 SERPL-SCNC: 25 MMOL/L (ref 21–32)
CREAT SERPL-MCNC: 0.8 MG/DL (ref 0.6–1.1)
DEPRECATED RDW RBC AUTO: 14.3 % (ref 12.4–15.4)
EOSINOPHIL # BLD: 0.1 K/UL (ref 0–0.6)
EOSINOPHIL NFR BLD: 1.6 %
GFR SERPLBLD CREATININE-BSD FMLA CKD-EPI: >60 ML/MIN/{1.73_M2}
GLUCOSE SERPL-MCNC: 106 MG/DL (ref 70–99)
HCG SERPL QL: NEGATIVE
HCT VFR BLD AUTO: 41.7 % (ref 36–48)
HGB BLD-MCNC: 13.5 G/DL (ref 12–16)
LYMPHOCYTES # BLD: 2.4 K/UL (ref 1–5.1)
LYMPHOCYTES NFR BLD: 38.4 %
MCH RBC QN AUTO: 26.2 PG (ref 26–34)
MCHC RBC AUTO-ENTMCNC: 32.3 G/DL (ref 31–36)
MCV RBC AUTO: 81.1 FL (ref 80–100)
MONOCYTES # BLD: 0.6 K/UL (ref 0–1.3)
MONOCYTES NFR BLD: 8.9 %
NEUTROPHILS # BLD: 3.1 K/UL (ref 1.7–7.7)
NEUTROPHILS NFR BLD: 48.9 %
PLATELET # BLD AUTO: 443 K/UL (ref 135–450)
PMV BLD AUTO: 8.9 FL (ref 5–10.5)
POTASSIUM SERPL-SCNC: 4.5 MMOL/L (ref 3.5–5.1)
POTASSIUM SERPL-SCNC: 5.4 MMOL/L (ref 3.5–5.1)
PROT SERPL-MCNC: 7.6 G/DL (ref 6.4–8.2)
RBC # BLD AUTO: 5.14 M/UL (ref 4–5.2)
SODIUM SERPL-SCNC: 140 MMOL/L (ref 136–145)
TROPONIN, HIGH SENSITIVITY: <6 NG/L (ref 0–14)
TROPONIN, HIGH SENSITIVITY: <6 NG/L (ref 0–14)
WBC # BLD AUTO: 6.4 K/UL (ref 4–11)

## 2023-06-10 PROCEDURE — 85025 COMPLETE CBC W/AUTO DIFF WBC: CPT

## 2023-06-10 PROCEDURE — 84484 ASSAY OF TROPONIN QUANT: CPT

## 2023-06-10 PROCEDURE — 84132 ASSAY OF SERUM POTASSIUM: CPT

## 2023-06-10 PROCEDURE — 36415 COLL VENOUS BLD VENIPUNCTURE: CPT

## 2023-06-10 PROCEDURE — 71046 X-RAY EXAM CHEST 2 VIEWS: CPT

## 2023-06-10 PROCEDURE — 80053 COMPREHEN METABOLIC PANEL: CPT

## 2023-06-10 PROCEDURE — 6370000000 HC RX 637 (ALT 250 FOR IP): Performed by: EMERGENCY MEDICINE

## 2023-06-10 PROCEDURE — 93005 ELECTROCARDIOGRAM TRACING: CPT | Performed by: EMERGENCY MEDICINE

## 2023-06-10 PROCEDURE — 84703 CHORIONIC GONADOTROPIN ASSAY: CPT

## 2023-06-10 RX ORDER — ACETAMINOPHEN 500 MG
1000 TABLET ORAL ONCE
Status: COMPLETED | OUTPATIENT
Start: 2023-06-10 | End: 2023-06-10

## 2023-06-10 RX ORDER — METHOCARBAMOL 500 MG/1
750 TABLET, FILM COATED ORAL ONCE
Status: COMPLETED | OUTPATIENT
Start: 2023-06-10 | End: 2023-06-10

## 2023-06-10 RX ADMIN — IBUPROFEN 600 MG: 200 TABLET, FILM COATED ORAL at 09:18

## 2023-06-10 RX ADMIN — ACETAMINOPHEN 1000 MG: 500 TABLET ORAL at 09:18

## 2023-06-10 RX ADMIN — METHOCARBAMOL 750 MG: 500 TABLET ORAL at 09:17

## 2023-06-10 ASSESSMENT — PAIN DESCRIPTION - ORIENTATION
ORIENTATION: MID
ORIENTATION: MID

## 2023-06-10 ASSESSMENT — LIFESTYLE VARIABLES: HOW OFTEN DO YOU HAVE A DRINK CONTAINING ALCOHOL: NEVER

## 2023-06-10 ASSESSMENT — PAIN DESCRIPTION - LOCATION
LOCATION: CHEST
LOCATION: CHEST

## 2023-06-10 ASSESSMENT — PAIN DESCRIPTION - DESCRIPTORS
DESCRIPTORS: STABBING
DESCRIPTORS: SHARP;STABBING

## 2023-06-10 ASSESSMENT — PAIN SCALES - GENERAL
PAINLEVEL_OUTOF10: 6
PAINLEVEL_OUTOF10: 6

## 2023-06-10 ASSESSMENT — PAIN - FUNCTIONAL ASSESSMENT: PAIN_FUNCTIONAL_ASSESSMENT: 0-10

## 2023-06-10 NOTE — ED NOTES
Educated pt on discharge paperwork as well as follow-up appointment. Pt verbalizes understanding of all instructions and denies questions. Pt left ambulatory by self with all personal belongings, and discharge paperwork to private residence. Pt in no distress at this time.      Radha Powell RN  06/10/23 2223

## 2023-06-10 NOTE — ED PROVIDER NOTES
[address] ENCOUNTER          ATTENDING PHYSICIAN NOTE       Date of evaluation: 6/10/2023      Assessment/ Medical Decion Making     MDM: Celi Whitlock is a 32 y.o. female with history of AVNRT status post ablation on March 31 who presents for evaluation of stabbing chest pain which she has had intermittently in the past.    I have considered multiple dangerous causes of chest pain including but not limited to ACS, PE, dissection, pericarditis/ myocarditis, pericardial tamponade, tension pneumothorax, esophageal rupture. Exam and vitals are reassuring. Chest x-ray without acute cardiopulmonary process. I performed bedside ultrasound which was without evidence of paracardial effusion though otherwise limited. Labs including troponin x2 are within normal limits and EKG demonstrates sinus rhythm without ischemic changes. Low clinical suspicion for dissection, PE based on absence of risk factors or physical exam findings suggestive of these in her nontoxic appearance. On my reassessment, patient expressed desire for discharge to home and I think this is appropriate given her reassuring work-up. Counseled to follow-up with her primary care provider to discuss next steps for testing or treatment. She is already established and was comfortable with this plan and with discharge. Medical Decision Making  Amount and/or Complexity of Data Reviewed  Labs: ordered. Radiology: ordered. Risk  OTC drugs. Prescription drug management. Angelica Gerardo MD  9:25 PM    Clinical Impression     1. Chest pain, unspecified type        Disposition     DISPOSITION Decision To Discharge 06/10/2023 11:55:20 AM        Chief Complaint     Chest Pain (Pt c/o midsternal chest pain x 1 day. Expresses feeling nausea but no vomiting or diarrhea.  Pain does not radiate anywhere and is intermittent )      History of Present Illness     Paras Oharaoro Heart is a 32 y.o. female with a PMH

## 2023-06-11 LAB
EKG ATRIAL RATE: 67 BPM
EKG DIAGNOSIS: NORMAL
EKG P AXIS: 15 DEGREES
EKG P-R INTERVAL: 184 MS
EKG Q-T INTERVAL: 392 MS
EKG QRS DURATION: 82 MS
EKG QTC CALCULATION (BAZETT): 414 MS
EKG R AXIS: 46 DEGREES
EKG T AXIS: 4 DEGREES
EKG VENTRICULAR RATE: 67 BPM

## 2023-06-18 ENCOUNTER — HOSPITAL ENCOUNTER (EMERGENCY)
Age: 28
Discharge: HOME | End: 2023-06-19
Attending: EMERGENCY MEDICINE
Payer: MEDICAID

## 2023-06-18 VITALS
TEMPERATURE: 97.8 F | HEIGHT: 66 IN | HEART RATE: 72 BPM | WEIGHT: 293 LBS | RESPIRATION RATE: 10 BRPM | BODY MASS INDEX: 47.09 KG/M2 | OXYGEN SATURATION: 100 % | DIASTOLIC BLOOD PRESSURE: 106 MMHG | SYSTOLIC BLOOD PRESSURE: 159 MMHG

## 2023-06-18 DIAGNOSIS — R00.2 PALPITATIONS: Primary | ICD-10-CM

## 2023-06-18 PROCEDURE — 93005 ELECTROCARDIOGRAM TRACING: CPT | Performed by: EMERGENCY MEDICINE

## 2023-06-18 PROCEDURE — 99283 EMERGENCY DEPT VISIT LOW MDM: CPT

## 2023-06-18 ASSESSMENT — PAIN SCALES - GENERAL: PAINLEVEL_OUTOF10: 4

## 2023-06-18 ASSESSMENT — PAIN DESCRIPTION - LOCATION: LOCATION: CHEST

## 2023-06-18 ASSESSMENT — PAIN - FUNCTIONAL ASSESSMENT: PAIN_FUNCTIONAL_ASSESSMENT: 0-10

## 2023-06-19 LAB
EKG ATRIAL RATE: 56 BPM
EKG DIAGNOSIS: NORMAL
EKG P AXIS: 20 DEGREES
EKG P-R INTERVAL: 202 MS
EKG Q-T INTERVAL: 414 MS
EKG QRS DURATION: 78 MS
EKG QTC CALCULATION (BAZETT): 399 MS
EKG R AXIS: 51 DEGREES
EKG T AXIS: 15 DEGREES
EKG VENTRICULAR RATE: 56 BPM

## 2023-06-19 ASSESSMENT — ENCOUNTER SYMPTOMS
RESPIRATORY NEGATIVE: 1
EYES NEGATIVE: 1
GASTROINTESTINAL NEGATIVE: 1

## 2023-06-19 NOTE — DISCHARGE INSTRUCTIONS
Your testing in the emergency department showed that your heart remains in a normal rhythm. The symptoms you are experiencing may be due to anxiety and we do recommend that you follow-up with your primary care provider to discuss any adjustments of your medications.

## 2023-06-19 NOTE — ED PROVIDER NOTES
[address] [facility]          ATTENDING PHYSICIAN NOTE       Date of evaluation: 6/18/2023    Chief Complaint     Dizziness (Started feeling dizzy this night same symptom she had when she had SVT. )      History of Present Illness     Paras Bhandari is a 29 y.o. female who presents to the emerged department complaining of palpitations and shortness of breath. Patient states her symptoms started approximate hour and half prior to presentation as she was getting ready for bed. Patient has a history of SVT status post ablation and has presented multiple times to the emergency department complaining of palpitations and found to be in a sinus rhythm. Patient states her symptoms lasted until she arrived in the emerge apartment and now she feels back to her baseline. She states she is on medications for anxiety and believes that they are not working for her. ASSESSMENT / PLAN  (MEDICAL DECISION MAKING)     INITIAL VITALS: BP: (!) 159/106, Temp: 97.8 °F (36.6 °C), Pulse: 72, Respirations: 10, SpO2: 100 %      Josiane Chung is a 29 y.o. female with history of SVT status post ablation presents complaining of palpitations and shortness of breath. Patient states her symptoms started as she was getting ready for bed but have now resolved. Patient has multiple prior emergency department visits for the same. On arrival, patient is mildly hypertensive but has a bradycardic heart rate in the 50s. She has clear breath sounds and normal heart sounds. EKG shows sinus bradycardia with no evidence of interval abnormalities. Patient was observed in the emergency room and had no evidence of arrhythmia on the monitor. I do feel the patient does have a component of anxiety as the cause of the symptoms so we will recommend that she follow-up with her primary care provider to discuss adjustments of her medications. Is this patient to be included in the SEP-1 core measure?  No Exclusion

## 2023-08-15 ENCOUNTER — TELEPHONE (OUTPATIENT)
Dept: PULMONOLOGY | Age: 28
End: 2023-08-15

## 2023-08-15 NOTE — TELEPHONE ENCOUNTER
Spoke with pt to inform her that [facility] is no longer in network with [facility] ([facility]). I advised she call [facility] and ask about continuity of care, or ask what other providers would be in network. Pt to call us back to let us know what [facility] says and if she will cancel appt 8/17.

## 2023-11-07 NOTE — DISCHARGE INSTRUCTIONS
LEFT Knee   Synvisc Series () = 3 injection visits   Approved Date Range = 11/2/23-5/2/24   Insurance Co =  [employer]   Routing to PSR to schedule. Thank you!   Please follow-up with your cardiologist for dose adjustments of metoprolol prior to your ablation. Please return to the ER for recurrent episodes of SVT that did not terminate with home therapy. Please return for chest pain, shortness of breath, or other concerning symptoms.

## 2023-11-10 ENCOUNTER — HOSPITAL ENCOUNTER (EMERGENCY)
Age: 28
Discharge: HOME | End: 2023-11-10
Attending: EMERGENCY MEDICINE

## 2023-11-10 ENCOUNTER — APPOINTMENT (OUTPATIENT)
Dept: GENERAL RADIOLOGY | Age: 28
End: 2023-11-10

## 2023-11-10 VITALS
RESPIRATION RATE: 22 BRPM | HEART RATE: 73 BPM | OXYGEN SATURATION: 93 % | DIASTOLIC BLOOD PRESSURE: 81 MMHG | WEIGHT: 293 LBS | SYSTOLIC BLOOD PRESSURE: 134 MMHG | TEMPERATURE: 98 F | HEIGHT: 66 IN | BODY MASS INDEX: 47.09 KG/M2

## 2023-11-10 DIAGNOSIS — R00.2 PALPITATIONS: Primary | ICD-10-CM

## 2023-11-10 LAB
ANION GAP SERPL CALCULATED.3IONS-SCNC: 12 MMOL/L (ref 3–16)
BUN SERPL-MCNC: 14 MG/DL (ref 7–20)
CALCIUM SERPL-MCNC: 9.3 MG/DL (ref 8.3–10.6)
CHLORIDE SERPL-SCNC: 99 MMOL/L (ref 99–110)
CO2 SERPL-SCNC: 26 MMOL/L (ref 21–32)
CREAT SERPL-MCNC: 0.9 MG/DL (ref 0.6–1.1)
EKG ATRIAL RATE: 74 BPM
EKG DIAGNOSIS: NORMAL
EKG P AXIS: 72 DEGREES
EKG P-R INTERVAL: 194 MS
EKG Q-T INTERVAL: 366 MS
EKG QRS DURATION: 80 MS
EKG QTC CALCULATION (BAZETT): 406 MS
EKG R AXIS: 48 DEGREES
EKG T AXIS: 26 DEGREES
EKG VENTRICULAR RATE: 74 BPM
GFR SERPLBLD CREATININE-BSD FMLA CKD-EPI: >60 ML/MIN/{1.73_M2}
GLUCOSE SERPL-MCNC: 136 MG/DL (ref 70–99)
POTASSIUM SERPL-SCNC: 3.9 MMOL/L (ref 3.5–5.1)
SODIUM SERPL-SCNC: 137 MMOL/L (ref 136–145)
TROPONIN, HIGH SENSITIVITY: <6 NG/L (ref 0–14)

## 2023-11-10 PROCEDURE — 99284 EMERGENCY DEPT VISIT MOD MDM: CPT

## 2023-11-10 PROCEDURE — 80048 BASIC METABOLIC PNL TOTAL CA: CPT

## 2023-11-10 PROCEDURE — 93005 ELECTROCARDIOGRAM TRACING: CPT

## 2023-11-10 PROCEDURE — 84484 ASSAY OF TROPONIN QUANT: CPT

## 2023-11-10 ASSESSMENT — ENCOUNTER SYMPTOMS
VOMITING: 0
SORE THROAT: 0
ABDOMINAL PAIN: 0
COUGH: 0
RHINORRHEA: 0
NAUSEA: 0
BLOOD IN STOOL: 0
DIARRHEA: 0
SHORTNESS OF BREATH: 0

## 2023-11-10 ASSESSMENT — PAIN - FUNCTIONAL ASSESSMENT: PAIN_FUNCTIONAL_ASSESSMENT: NONE - DENIES PAIN

## 2023-11-10 NOTE — DISCHARGE INSTRUCTIONS
You were seen in the emergency department today after an episode of feeling flushed, short of breath, and anxious. Your labs and EKG did not show evidence of abnormal heart rhythm or heart damage. Please continue to monitor your symptoms and to follow-up with your primary care doctor and cardiologist.    En Ochoa can return to the emergency department at any time for symptoms of chest pain, shortness of breath or difficulty breathing, dizziness or fainting, or any other concerning symptoms that you feel need emergent evaluation.

## 2023-11-10 NOTE — ED PROVIDER NOTES
ED Attending Attestation Note     Date of evaluation: 11/10/2023    This patient was seen by the resident. I have seen and examined the patient, agree with the workup, evaluation, management and diagnosis. The care plan has been discussed. I have reviewed the ECG and concur with the resident's interpretation. My assessment reveals an overall very well-appearing patient, pleasantly conversational, in no acute distress. She has a history of SVT, status post ablation by Dr. Yelena Gramajo in March of this year, although she has had a number of presentations to the emergency department since that time for palpitations concerning to her for SVT, with normal EKGs at those times. She presents today with an episode of palpitations that felt concerning Milana Pean like SVT, although had improved without entirely resolving by the time of arrival to the emergency department. She has maintained normal sinus rhythm throughout her emergency department stay.          Venkata Espinoza MD  11/10/23 [id number]

## 2024-01-24 ENCOUNTER — HOSPITAL ENCOUNTER (EMERGENCY)
Age: 29
Discharge: HOME | End: 2024-01-24
Attending: EMERGENCY MEDICINE
Payer: MEDICAID

## 2024-01-24 ENCOUNTER — APPOINTMENT (OUTPATIENT)
Dept: GENERAL RADIOLOGY | Age: 29
End: 2024-01-24
Payer: MEDICAID

## 2024-01-24 VITALS
OXYGEN SATURATION: 100 % | SYSTOLIC BLOOD PRESSURE: 129 MMHG | TEMPERATURE: 98.2 F | RESPIRATION RATE: 18 BRPM | HEART RATE: 68 BPM | WEIGHT: 293 LBS | BODY MASS INDEX: 57.14 KG/M2 | DIASTOLIC BLOOD PRESSURE: 71 MMHG

## 2024-01-24 DIAGNOSIS — R07.9 CHEST PAIN, UNSPECIFIED TYPE: Primary | ICD-10-CM

## 2024-01-24 LAB
EKG ATRIAL RATE: 65 BPM
EKG DIAGNOSIS: NORMAL
EKG P AXIS: 33 DEGREES
EKG P-R INTERVAL: 168 MS
EKG Q-T INTERVAL: 402 MS
EKG QRS DURATION: 82 MS
EKG QTC CALCULATION (BAZETT): 418 MS
EKG R AXIS: 60 DEGREES
EKG T AXIS: 11 DEGREES
EKG VENTRICULAR RATE: 65 BPM

## 2024-01-24 PROCEDURE — 71046 X-RAY EXAM CHEST 2 VIEWS: CPT

## 2024-01-24 PROCEDURE — 93005 ELECTROCARDIOGRAM TRACING: CPT | Performed by: EMERGENCY MEDICINE

## 2024-01-24 PROCEDURE — 99284 EMERGENCY DEPT VISIT MOD MDM: CPT

## 2024-01-24 RX ORDER — OMEPRAZOLE 20 MG/1
20 CAPSULE, DELAYED RELEASE ORAL
Qty: 30 CAPSULE | Refills: 0 | Status: SHIPPED | OUTPATIENT
Start: 2024-01-24

## 2024-01-24 ASSESSMENT — ENCOUNTER SYMPTOMS
SHORTNESS OF BREATH: 1
EYES NEGATIVE: 1
GASTROINTESTINAL NEGATIVE: 1
COUGH: 0

## 2024-01-24 ASSESSMENT — PAIN - FUNCTIONAL ASSESSMENT: PAIN_FUNCTIONAL_ASSESSMENT: 0-10

## 2024-01-24 ASSESSMENT — PAIN SCALES - GENERAL: PAINLEVEL_OUTOF10: 5

## 2024-01-24 NOTE — ED PROVIDER NOTES
medications:  Orders Placed This Encounter   Medications    omeprazole (PRILOSEC) 20 MG delayed release capsule     Sig: Take 1 capsule by mouth every morning (before breakfast)     Dispense:  30 capsule     Refill:  0       CONSULTS:  None    Review of Systems     Review of Systems   Constitutional: Negative.    HENT: Negative.     Eyes: Negative.    Respiratory:  Positive for shortness of breath. Negative for cough.    Cardiovascular:  Positive for chest pain.   Gastrointestinal: Negative.    Genitourinary: Negative.    Musculoskeletal: Negative.    Neurological: Negative.    All other systems reviewed and are negative.      Past Medical, Surgical, Family, and Social History     She has a past medical history of Class 3 severe obesity due to excess calories without serious comorbidity with body mass index (BMI) of 50.0 to 59.9 in adult (HCC), Hypertension, Prediabetes, SVT (supraventricular tachycardia), and TIA (transient ischemic attack).  She has no past surgical history on file.  Her family history includes Sleep Apnea in her brother.  She reports that she quit smoking about 3 years ago. Her smoking use included cigarettes. She started smoking about 7 years ago. She has a 0.4 pack-year smoking history. She has never used smokeless tobacco. She reports that she does not drink alcohol and does not use drugs.    Medications     Current Discharge Medication List        CONTINUE these medications which have NOT CHANGED    Details   hydrOXYzine HCl (ATARAX) 25 MG tablet Take 1 tablet by mouth 3 times daily as needed for Itching      metoprolol tartrate (LOPRESSOR) 50 MG tablet Take 1 tablet by mouth 2 times daily  Qty: 60 tablet, Refills: 5      Dulaglutide (TRULICITY SC) Inject into the skin once a week Mondays      aspirin 81 MG chewable tablet Take 1 tablet by mouth daily  Qty: 90 tablet, Refills: 1             Allergies     She is allergic to dilaudid [hydromorphone hcl].    Physical Exam     INITIAL VITALS: BP:

## 2024-01-24 NOTE — DISCHARGE INSTRUCTIONS
Your testing in the emergency department did not determine a cause of your chest pain.  This may be related to stomach acids getting into your esophagus since it tends to happen at night.  Please start taking Prilosec once daily again.  Please follow-up with your primary care provider for repeat evaluation and further testing if symptoms continue.

## 2024-02-19 ENCOUNTER — HOSPITAL ENCOUNTER (EMERGENCY)
Age: 29
Discharge: HOME | End: 2024-02-19
Attending: EMERGENCY MEDICINE
Payer: MEDICAID

## 2024-02-19 ENCOUNTER — APPOINTMENT (OUTPATIENT)
Dept: GENERAL RADIOLOGY | Age: 29
End: 2024-02-19
Payer: MEDICAID

## 2024-02-19 VITALS
BODY MASS INDEX: 47.09 KG/M2 | HEIGHT: 66 IN | OXYGEN SATURATION: 100 % | SYSTOLIC BLOOD PRESSURE: 157 MMHG | WEIGHT: 293 LBS | HEART RATE: 77 BPM | DIASTOLIC BLOOD PRESSURE: 93 MMHG | TEMPERATURE: 97.4 F | RESPIRATION RATE: 18 BRPM

## 2024-02-19 DIAGNOSIS — R06.02 SHORTNESS OF BREATH: Primary | ICD-10-CM

## 2024-02-19 DIAGNOSIS — U07.1 COVID: ICD-10-CM

## 2024-02-19 LAB
ANION GAP SERPL CALCULATED.3IONS-SCNC: 11 MMOL/L (ref 3–16)
BUN SERPL-MCNC: 15 MG/DL (ref 7–20)
CALCIUM SERPL-MCNC: 9.3 MG/DL (ref 8.3–10.6)
CHLORIDE SERPL-SCNC: 97 MMOL/L (ref 99–110)
CO2 SERPL-SCNC: 24 MMOL/L (ref 21–32)
CREAT SERPL-MCNC: 1 MG/DL (ref 0.6–1.1)
EKG ATRIAL RATE: 101 BPM
EKG DIAGNOSIS: NORMAL
EKG P AXIS: 54 DEGREES
EKG P-R INTERVAL: 174 MS
EKG Q-T INTERVAL: 344 MS
EKG QRS DURATION: 78 MS
EKG QTC CALCULATION (BAZETT): 446 MS
EKG R AXIS: 77 DEGREES
EKG T AXIS: 2 DEGREES
EKG VENTRICULAR RATE: 101 BPM
FLUAV RNA RESP QL NAA+PROBE: NOT DETECTED
FLUBV RNA RESP QL NAA+PROBE: NOT DETECTED
GFR SERPLBLD CREATININE-BSD FMLA CKD-EPI: >60 ML/MIN/{1.73_M2}
GLUCOSE SERPL-MCNC: 128 MG/DL (ref 70–99)
HCG SERPL QL: NEGATIVE
MAGNESIUM SERPL-MCNC: 2 MG/DL (ref 1.8–2.4)
PHOSPHATE SERPL-MCNC: 4 MG/DL (ref 2.5–4.9)
POTASSIUM SERPL-SCNC: 4.2 MMOL/L (ref 3.5–5.1)
SARS-COV-2 RNA RESP QL NAA+PROBE: DETECTED
SODIUM SERPL-SCNC: 132 MMOL/L (ref 136–145)

## 2024-02-19 PROCEDURE — 93005 ELECTROCARDIOGRAM TRACING: CPT | Performed by: STUDENT IN AN ORGANIZED HEALTH CARE EDUCATION/TRAINING PROGRAM

## 2024-02-19 PROCEDURE — 99285 EMERGENCY DEPT VISIT HI MDM: CPT

## 2024-02-19 PROCEDURE — 87636 SARSCOV2 & INF A&B AMP PRB: CPT

## 2024-02-19 PROCEDURE — 80048 BASIC METABOLIC PNL TOTAL CA: CPT

## 2024-02-19 PROCEDURE — 84703 CHORIONIC GONADOTROPIN ASSAY: CPT

## 2024-02-19 PROCEDURE — 84100 ASSAY OF PHOSPHORUS: CPT

## 2024-02-19 PROCEDURE — 83735 ASSAY OF MAGNESIUM: CPT

## 2024-02-19 PROCEDURE — 71046 X-RAY EXAM CHEST 2 VIEWS: CPT

## 2024-02-19 PROCEDURE — 36415 COLL VENOUS BLD VENIPUNCTURE: CPT

## 2024-02-19 ASSESSMENT — ENCOUNTER SYMPTOMS
VOMITING: 0
CHEST TIGHTNESS: 0
RHINORRHEA: 1
NAUSEA: 1
CONSTIPATION: 0
EYE PAIN: 0
BLOOD IN STOOL: 0
DIARRHEA: 0
SHORTNESS OF BREATH: 1
WHEEZING: 0
COUGH: 0
ABDOMINAL PAIN: 0

## 2024-02-19 ASSESSMENT — PAIN - FUNCTIONAL ASSESSMENT: PAIN_FUNCTIONAL_ASSESSMENT: NONE - DENIES PAIN

## 2024-02-19 NOTE — ED PROVIDER NOTES
AM          DISPOSITION Decision To Discharge 02/19/2024 02:05:37 AM       Lucía Jimenez MD  Resident  02/19/24 0226

## 2024-02-19 NOTE — ED PROVIDER NOTES
ED Attending Attestation Note     Date of evaluation: 2/19/2024    This patient was seen by the resident.  I have seen and examined the patient, agree with the workup, evaluation, management and diagnosis. The care plan has been discussed.  I have reviewed the ECG and concur with the resident's interpretation.  My assessment reveals complete palpitations.  Patient has a history of SVT status post ablation and presents frequently to the emergency room and feeling like she is in SVT.  Patient also notes that she had a family member passed recently and since then has felt more anxious.  On arrival, patient has normal heart rate.  Will check laboratory studies, observe in emergency department.     Deepak Gagnon MD  02/19/24 023

## 2024-02-19 NOTE — DISCHARGE INSTRUCTIONS
You are seen in the emergency department for your symptoms.  Your electrolytes were normal.  Your EKG was normal.  Follow-up with your primary care doctor and discuss your symptoms.  You should talk to them about your inability to wear CPAP at night and discuss alternative treatments for your sleep apnea.

## 2024-02-26 VITALS
SYSTOLIC BLOOD PRESSURE: 145 MMHG | HEART RATE: 59 BPM | RESPIRATION RATE: 16 BRPM | OXYGEN SATURATION: 100 % | TEMPERATURE: 98 F | WEIGHT: 293 LBS | BODY MASS INDEX: 57.08 KG/M2 | DIASTOLIC BLOOD PRESSURE: 96 MMHG

## 2024-02-26 PROCEDURE — 93005 ELECTROCARDIOGRAM TRACING: CPT | Performed by: EMERGENCY MEDICINE

## 2024-02-26 PROCEDURE — 99284 EMERGENCY DEPT VISIT MOD MDM: CPT

## 2024-02-26 ASSESSMENT — PAIN - FUNCTIONAL ASSESSMENT: PAIN_FUNCTIONAL_ASSESSMENT: NONE - DENIES PAIN

## 2024-02-27 ENCOUNTER — HOSPITAL ENCOUNTER (EMERGENCY)
Age: 29
Discharge: HOME | End: 2024-02-27
Attending: EMERGENCY MEDICINE
Payer: MEDICAID

## 2024-02-27 DIAGNOSIS — R42 LIGHTHEADEDNESS: Primary | ICD-10-CM

## 2024-02-27 LAB
ALBUMIN SERPL-MCNC: 3.7 G/DL (ref 3.4–5)
ALBUMIN/GLOB SERPL: 1.1 {RATIO} (ref 1.1–2.2)
ALP SERPL-CCNC: 89 U/L (ref 40–129)
ALT SERPL-CCNC: 19 U/L (ref 10–40)
AMORPH SED URNS QL MICRO: ABNORMAL /HPF
ANION GAP SERPL CALCULATED.3IONS-SCNC: 10 MMOL/L (ref 3–16)
AST SERPL-CCNC: 20 U/L (ref 15–37)
BACTERIA URNS QL MICRO: ABNORMAL /HPF
BASOPHILS # BLD: 0.1 K/UL (ref 0–0.2)
BASOPHILS NFR BLD: 0.9 %
BILIRUB SERPL-MCNC: <0.2 MG/DL (ref 0–1)
BILIRUB UR QL STRIP.AUTO: NEGATIVE
BUN SERPL-MCNC: 13 MG/DL (ref 7–20)
CALCIUM SERPL-MCNC: 9.4 MG/DL (ref 8.3–10.6)
CHLORIDE SERPL-SCNC: 102 MMOL/L (ref 99–110)
CLARITY UR: CLEAR
CO2 SERPL-SCNC: 24 MMOL/L (ref 21–32)
COLOR UR: YELLOW
CREAT SERPL-MCNC: 0.9 MG/DL (ref 0.6–1.1)
DEPRECATED RDW RBC AUTO: 14.1 % (ref 12.4–15.4)
EKG ATRIAL RATE: 67 BPM
EKG DIAGNOSIS: NORMAL
EKG P AXIS: 33 DEGREES
EKG P-R INTERVAL: 162 MS
EKG Q-T INTERVAL: 402 MS
EKG QRS DURATION: 76 MS
EKG QTC CALCULATION (BAZETT): 424 MS
EKG R AXIS: 46 DEGREES
EKG T AXIS: 6 DEGREES
EKG VENTRICULAR RATE: 67 BPM
EOSINOPHIL # BLD: 0.1 K/UL (ref 0–0.6)
EOSINOPHIL NFR BLD: 1.5 %
EPI CELLS #/AREA URNS HPF: ABNORMAL /HPF (ref 0–5)
GFR SERPLBLD CREATININE-BSD FMLA CKD-EPI: >60 ML/MIN/{1.73_M2}
GLUCOSE SERPL-MCNC: 126 MG/DL (ref 70–99)
GLUCOSE UR STRIP.AUTO-MCNC: NEGATIVE MG/DL
HCG UR QL: NEGATIVE
HCT VFR BLD AUTO: 40.3 % (ref 36–48)
HGB BLD-MCNC: 13.2 G/DL (ref 12–16)
HGB UR QL STRIP.AUTO: ABNORMAL
KETONES UR STRIP.AUTO-MCNC: NEGATIVE MG/DL
LEUKOCYTE ESTERASE UR QL STRIP.AUTO: ABNORMAL
LYMPHOCYTES # BLD: 2.8 K/UL (ref 1–5.1)
LYMPHOCYTES NFR BLD: 37.2 %
MCH RBC QN AUTO: 26.5 PG (ref 26–34)
MCHC RBC AUTO-ENTMCNC: 32.7 G/DL (ref 31–36)
MCV RBC AUTO: 81.1 FL (ref 80–100)
MONOCYTES # BLD: 0.8 K/UL (ref 0–1.3)
MONOCYTES NFR BLD: 11.4 %
NEUTROPHILS # BLD: 3.6 K/UL (ref 1.7–7.7)
NEUTROPHILS NFR BLD: 49 %
NITRITE UR QL STRIP.AUTO: NEGATIVE
PH UR STRIP.AUTO: 6 [PH] (ref 5–8)
PLATELET # BLD AUTO: 400 K/UL (ref 135–450)
PMV BLD AUTO: 7.9 FL (ref 5–10.5)
POTASSIUM SERPL-SCNC: 4.2 MMOL/L (ref 3.5–5.1)
PROT SERPL-MCNC: 7.1 G/DL (ref 6.4–8.2)
PROT UR STRIP.AUTO-MCNC: NEGATIVE MG/DL
RBC # BLD AUTO: 4.97 M/UL (ref 4–5.2)
RBC #/AREA URNS HPF: ABNORMAL /HPF (ref 0–4)
RENAL EPI CELLS #/AREA UR COMP ASSIST: ABNORMAL /HPF (ref 0–1)
SODIUM SERPL-SCNC: 136 MMOL/L (ref 136–145)
SP GR UR STRIP.AUTO: 1.01 (ref 1–1.03)
UA COMPLETE W REFLEX CULTURE PNL UR: ABNORMAL
UA DIPSTICK W REFLEX MICRO PNL UR: YES
URN SPEC COLLECT METH UR: ABNORMAL
UROBILINOGEN UR STRIP-ACNC: 0.2 E.U./DL
WBC # BLD AUTO: 7.4 K/UL (ref 4–11)
WBC #/AREA URNS HPF: ABNORMAL /HPF (ref 0–5)

## 2024-02-27 PROCEDURE — 80053 COMPREHEN METABOLIC PANEL: CPT

## 2024-02-27 PROCEDURE — 36415 COLL VENOUS BLD VENIPUNCTURE: CPT

## 2024-02-27 PROCEDURE — 85025 COMPLETE CBC W/AUTO DIFF WBC: CPT

## 2024-02-27 PROCEDURE — 84703 CHORIONIC GONADOTROPIN ASSAY: CPT

## 2024-02-27 PROCEDURE — 81003 URINALYSIS AUTO W/O SCOPE: CPT

## 2024-02-27 NOTE — ED TRIAGE NOTES
[facility] Emergency Department  MEDICAL SCREENING EXAM    Date of Service: 2/26/2024    Reason for Visit: Dizziness and Nausea (Pt. Presents to ED with c/o two episodes of lightheadedness with nausea that happened when she woke up. )        Patient History, Brief Exam, and Initial Assessment     Abbreviated HPI: Paras Javier is a 28 y.o. female presenting with lightheadedness.  The patient is having hard time describing her complaints.  She has been feeling lightheaded with some nausea all day.  She has no pain.  She has no abdominal pain.  She thinks it feels similar to when she had SVT in the past but she had an ablation 6 months ago and has not had any issues with SVT since.  Benign exam.    INITIAL VITALS: BP: (!) 145/96, Temp: 98 °F (36.7 °C), Pulse: 59, Respirations: 16, SpO2: 100 %    Plan     Patient was evaluated in the REU for a medical screening exam.  To further evaluate the presenting complaints, the following orders have been placed:  Orders Placed This Encounter   Procedures    CBC with Auto Differential    CMP w/ Reflex to MG    Urinalysis with Reflex to Culture    Urine Preg (Lab)    Orthostatic blood pressure and pulse    EKG 12 Lead        See primary provider's note for full details and final disposition.     Current Facility-Administered Medications:   No orders of the defined types were placed in this encounter.        REU Dispo     Stable for lobby while awaiting ED bed      Relevant Medical History     Past Medical History:   Diagnosis Date    Class 3 severe obesity due to excess calories without serious comorbidity with body mass index (BMI) of 50.0 to 59.9 in adult (HCC) 02/15/2023    Hypertension     Prediabetes     SVT (supraventricular tachycardia) 09/02/2022    TIA (transient ischemic attack)      History reviewed. No pertinent surgical history.  Allergies   Allergen Reactions    Dilaudid [Hydromorphone Hcl] Anaphylaxis

## 2024-02-29 PROCEDURE — 99284 EMERGENCY DEPT VISIT MOD MDM: CPT

## 2024-03-01 ENCOUNTER — HOSPITAL ENCOUNTER (EMERGENCY)
Age: 29
Discharge: HOME | End: 2024-03-01
Attending: EMERGENCY MEDICINE
Payer: MEDICAID

## 2024-03-01 VITALS
RESPIRATION RATE: 17 BRPM | TEMPERATURE: 98.2 F | DIASTOLIC BLOOD PRESSURE: 39 MMHG | SYSTOLIC BLOOD PRESSURE: 112 MMHG | OXYGEN SATURATION: 99 % | HEART RATE: 62 BPM

## 2024-03-01 VITALS
OXYGEN SATURATION: 98 % | RESPIRATION RATE: 18 BRPM | SYSTOLIC BLOOD PRESSURE: 136 MMHG | WEIGHT: 293 LBS | HEIGHT: 66 IN | DIASTOLIC BLOOD PRESSURE: 62 MMHG | TEMPERATURE: 98.8 F | HEART RATE: 68 BPM | BODY MASS INDEX: 47.09 KG/M2

## 2024-03-01 DIAGNOSIS — N39.0 UTI (URINARY TRACT INFECTION), UNCOMPLICATED: ICD-10-CM

## 2024-03-01 DIAGNOSIS — R06.02 SHORTNESS OF BREATH: Primary | ICD-10-CM

## 2024-03-01 DIAGNOSIS — R51.9 NONINTRACTABLE HEADACHE, UNSPECIFIED CHRONICITY PATTERN, UNSPECIFIED HEADACHE TYPE: Primary | ICD-10-CM

## 2024-03-01 LAB
AMORPH SED URNS QL MICRO: ABNORMAL /HPF
BACTERIA URNS QL MICRO: ABNORMAL /HPF
BILIRUB UR QL STRIP.AUTO: NEGATIVE
CLARITY UR: CLEAR
COLOR UR: YELLOW
EKG ATRIAL RATE: 58 BPM
EKG DIAGNOSIS: NORMAL
EKG P AXIS: 29 DEGREES
EKG P-R INTERVAL: 180 MS
EKG Q-T INTERVAL: 410 MS
EKG QRS DURATION: 80 MS
EKG QTC CALCULATION (BAZETT): 402 MS
EKG R AXIS: 50 DEGREES
EKG T AXIS: 18 DEGREES
EKG VENTRICULAR RATE: 58 BPM
EPI CELLS #/AREA URNS HPF: ABNORMAL /HPF (ref 0–5)
GLUCOSE UR STRIP.AUTO-MCNC: NEGATIVE MG/DL
HCG UR QL: NEGATIVE
HGB UR QL STRIP.AUTO: NEGATIVE
KETONES UR STRIP.AUTO-MCNC: NEGATIVE MG/DL
LEUKOCYTE ESTERASE UR QL STRIP.AUTO: ABNORMAL
MUCOUS THREADS #/AREA URNS LPF: ABNORMAL /LPF
NITRITE UR QL STRIP.AUTO: NEGATIVE
PH UR STRIP.AUTO: 6 [PH] (ref 5–8)
PROT UR STRIP.AUTO-MCNC: NEGATIVE MG/DL
RBC #/AREA URNS HPF: ABNORMAL /HPF (ref 0–4)
SP GR UR STRIP.AUTO: 1.02 (ref 1–1.03)
UA DIPSTICK W REFLEX MICRO PNL UR: YES
URN SPEC COLLECT METH UR: ABNORMAL
UROBILINOGEN UR STRIP-ACNC: 1 E.U./DL
WBC #/AREA URNS HPF: ABNORMAL /HPF (ref 0–5)

## 2024-03-01 PROCEDURE — 87186 SC STD MICRODIL/AGAR DIL: CPT

## 2024-03-01 PROCEDURE — 87086 URINE CULTURE/COLONY COUNT: CPT

## 2024-03-01 PROCEDURE — 81001 URINALYSIS AUTO W/SCOPE: CPT

## 2024-03-01 PROCEDURE — 93005 ELECTROCARDIOGRAM TRACING: CPT | Performed by: EMERGENCY MEDICINE

## 2024-03-01 PROCEDURE — 99283 EMERGENCY DEPT VISIT LOW MDM: CPT

## 2024-03-01 PROCEDURE — 84703 CHORIONIC GONADOTROPIN ASSAY: CPT

## 2024-03-01 PROCEDURE — 87088 URINE BACTERIA CULTURE: CPT

## 2024-03-01 RX ORDER — ONDANSETRON 4 MG/1
8 TABLET, ORALLY DISINTEGRATING ORAL ONCE
Status: DISCONTINUED | OUTPATIENT
Start: 2024-03-01 | End: 2024-03-01 | Stop reason: HOSPADM

## 2024-03-01 RX ORDER — METHOCARBAMOL 500 MG/1
1000 TABLET, FILM COATED ORAL ONCE
Status: DISCONTINUED | OUTPATIENT
Start: 2024-03-01 | End: 2024-03-01 | Stop reason: HOSPADM

## 2024-03-01 RX ORDER — KETOROLAC TROMETHAMINE 30 MG/ML
30 INJECTION, SOLUTION INTRAMUSCULAR; INTRAVENOUS ONCE
Status: DISCONTINUED | OUTPATIENT
Start: 2024-03-01 | End: 2024-03-01 | Stop reason: HOSPADM

## 2024-03-01 ASSESSMENT — ENCOUNTER SYMPTOMS
ABDOMINAL PAIN: 0
VOMITING: 0
SHORTNESS OF BREATH: 1
NAUSEA: 1
CONSTIPATION: 0
COUGH: 0
DIARRHEA: 0

## 2024-03-01 ASSESSMENT — LIFESTYLE VARIABLES: HOW OFTEN DO YOU HAVE A DRINK CONTAINING ALCOHOL: NEVER

## 2024-03-01 NOTE — ED PROVIDER NOTES
EMERGENCY DEPARTMENT ENCOUNTER     [facility] EMERGENCY DEPARTMENT     Pt Name: Paras Javier   MRN: [id number]   Birthdate 1995   Date of evaluation: 3/1/2024   Provider: Alvaro Stevens MD   PCP: [facility]   Note Started: 5:11 PM EST 3/1/24     CHIEF COMPLAINT     Chief Complaint   Patient presents with    Headache        HISTORY OF PRESENT ILLNESS:  History from : Patient   Limitations to history : None     Paras Javier is a 28 y.o. female who presents for evaluation of headache.  Patient reports that she has had a headache that started over the past several hours.  She states that she has headaches typically.  She states that these headaches typically then lead into a panic attack where she has chest discomfort and has been told that she may have had SVT in the past.  She states that she has been prescribed escitalopram for this and has been taking this although has not noted any significant improvement.  No recent falls or head injuries.  No fevers.    Nursing Notes were all reviewed and agreed with or any disagreements were addressed in the HPI.     ROS: Positives and Pertinent negatives as per HPI.    PAST MEDICAL HISTORY     Past medical history:  has a past medical history of Class 3 severe obesity due to excess calories without serious comorbidity with body mass index (BMI) of 50.0 to 59.9 in adult (HCC) (02/15/2023), Hypertension, Prediabetes, SVT (supraventricular tachycardia) (09/02/2022), and TIA (transient ischemic attack).    Past surgical history:  has no past surgical history on file.      PHYSICAL EXAM:  ED Triage Vitals [03/01/24 1601]   BP Temp Temp Source Pulse Respirations SpO2 Height Weight - Scale   136/62 98.8 °F (37.1 °C) Oral 68 18 98 % 1.676 m (5' 6\") (!) 157.4 kg (347 lb)        Physical Exam   There is no focal neurological deficit.  There is no cranial nerve deficit, cranial nerves II through XII are grossly intact.  No respiratory distress.  There is

## 2024-03-01 NOTE — DISCHARGE INSTRUCTIONS
Your symptoms are most likely due to anxiety.  Your testing in the emergency department is reassuring, especially in light of the multiple other emergency room visits you have had for same symptoms.  Please continue your current medications.  Take your hydroxyzine as needed for anxiety.  Please follow-up with your primary care provider at the [facility] for repeat evaluation and adjustments of medications to help control your symptoms.

## 2024-03-01 NOTE — ED PROVIDER NOTES
UA 3+ /HPF   hCG, qualitative, pregnancy (Lab)   Result Value Ref Range    HCG(Urine) Pregnancy Test Negative Detects HCG level >20 MIU/mL     EKG   EKG is a interpreted by me shows the patient to be in a sinus bradycardic rhythm with a rate of 58, normal axis, normal NM and QT intervals, normal QRS duration, no ST segment abnormalities, no T wave abnormalities    ED BEDSIDE ULTRASOUND:  No results found.    MOST RECENT VITALS:  BP: (!) 112/39,Temp: 98.2 °F (36.8 °C), Pulse: 62, Respirations: 17, SpO2: 99 %     Procedures     N/A    ED Course     Nursing Notes, Past Medical Hx, Past Surgical Hx, Social Hx,Allergies, and Family Hx were reviewed.         The patient was given the following medications:  No orders of the defined types were placed in this encounter.      CONSULTS:  None    Review of Systems     Review of Systems   Constitutional: Negative.    HENT: Negative.     Respiratory:  Positive for shortness of breath. Negative for cough.    Cardiovascular:  Positive for palpitations. Negative for chest pain.   Gastrointestinal:  Positive for nausea. Negative for abdominal pain, constipation, diarrhea and vomiting.   Genitourinary: Negative.    Musculoskeletal: Negative.    Neurological: Negative.    All other systems reviewed and are negative.      Past Medical, Surgical, Family, and Social History     She has a past medical history of Class 3 severe obesity due to excess calories without serious comorbidity with body mass index (BMI) of 50.0 to 59.9 in adult (HCC), Hypertension, Prediabetes, SVT (supraventricular tachycardia), and TIA (transient ischemic attack).  She has no past surgical history on file.  Her family history includes Sleep Apnea in her brother.  She reports that she quit smoking about 3 years ago. Her smoking use included cigarettes. She started smoking about 7 years ago. She has a 0.4 pack-year smoking history. She has never used smokeless tobacco. She reports that she does not drink alcohol  General: Skin is warm and dry.   Neurological:      General: No focal deficit present.      Mental Status: She is alert and oriented to person, place, and time.      Cranial Nerves: No cranial nerve deficit.      Motor: No weakness.      Coordination: Coordination normal.                    Deepak Gagnon MD  03/01/24 0259

## 2024-03-01 NOTE — DISCHARGE INSTRUCTIONS
As discussed, you need to followup with your primary care doctor at the [facility] for continued management of your anxiety.

## 2024-03-02 ENCOUNTER — HOSPITAL ENCOUNTER (EMERGENCY)
Age: 29
Discharge: HOME | End: 2024-03-02
Attending: STUDENT IN AN ORGANIZED HEALTH CARE EDUCATION/TRAINING PROGRAM
Payer: MEDICAID

## 2024-03-02 VITALS
RESPIRATION RATE: 18 BRPM | DIASTOLIC BLOOD PRESSURE: 73 MMHG | BODY MASS INDEX: 47.09 KG/M2 | OXYGEN SATURATION: 100 % | WEIGHT: 293 LBS | SYSTOLIC BLOOD PRESSURE: 126 MMHG | HEIGHT: 66 IN | HEART RATE: 58 BPM | TEMPERATURE: 98.5 F

## 2024-03-02 DIAGNOSIS — R55 NEAR SYNCOPE: Primary | ICD-10-CM

## 2024-03-02 DIAGNOSIS — R51.9 ACUTE NONINTRACTABLE HEADACHE, UNSPECIFIED HEADACHE TYPE: ICD-10-CM

## 2024-03-02 LAB
ANION GAP SERPL CALCULATED.3IONS-SCNC: 10 MMOL/L (ref 3–16)
BASOPHILS # BLD: 0.1 K/UL (ref 0–0.2)
BASOPHILS NFR BLD: 0.9 %
BUN SERPL-MCNC: 15 MG/DL (ref 7–20)
CALCIUM SERPL-MCNC: 9.5 MG/DL (ref 8.3–10.6)
CHLORIDE SERPL-SCNC: 102 MMOL/L (ref 99–110)
CO2 SERPL-SCNC: 27 MMOL/L (ref 21–32)
CREAT SERPL-MCNC: 1 MG/DL (ref 0.6–1.1)
DEPRECATED RDW RBC AUTO: 14.2 % (ref 12.4–15.4)
EOSINOPHIL # BLD: 0.1 K/UL (ref 0–0.6)
EOSINOPHIL NFR BLD: 1.2 %
GFR SERPLBLD CREATININE-BSD FMLA CKD-EPI: >60 ML/MIN/{1.73_M2}
GLUCOSE SERPL-MCNC: 101 MG/DL (ref 70–99)
HCT VFR BLD AUTO: 38.5 % (ref 36–48)
HGB BLD-MCNC: 12.5 G/DL (ref 12–16)
LYMPHOCYTES # BLD: 2.7 K/UL (ref 1–5.1)
LYMPHOCYTES NFR BLD: 32.5 %
MAGNESIUM SERPL-MCNC: 2 MG/DL (ref 1.8–2.4)
MCH RBC QN AUTO: 26.5 PG (ref 26–34)
MCHC RBC AUTO-ENTMCNC: 32.5 G/DL (ref 31–36)
MCV RBC AUTO: 81.7 FL (ref 80–100)
MONOCYTES # BLD: 1 K/UL (ref 0–1.3)
MONOCYTES NFR BLD: 11.9 %
NEUTROPHILS # BLD: 4.5 K/UL (ref 1.7–7.7)
NEUTROPHILS NFR BLD: 53.5 %
PLATELET # BLD AUTO: 370 K/UL (ref 135–450)
PMV BLD AUTO: 7.7 FL (ref 5–10.5)
POTASSIUM SERPL-SCNC: 4.2 MMOL/L (ref 3.5–5.1)
RBC # BLD AUTO: 4.71 M/UL (ref 4–5.2)
SODIUM SERPL-SCNC: 139 MMOL/L (ref 136–145)
WBC # BLD AUTO: 8.4 K/UL (ref 4–11)

## 2024-03-02 PROCEDURE — 99283 EMERGENCY DEPT VISIT LOW MDM: CPT

## 2024-03-02 PROCEDURE — 80048 BASIC METABOLIC PNL TOTAL CA: CPT

## 2024-03-02 PROCEDURE — 83735 ASSAY OF MAGNESIUM: CPT

## 2024-03-02 PROCEDURE — 36415 COLL VENOUS BLD VENIPUNCTURE: CPT

## 2024-03-02 PROCEDURE — 93005 ELECTROCARDIOGRAM TRACING: CPT | Performed by: PHYSICIAN ASSISTANT

## 2024-03-02 PROCEDURE — 85025 COMPLETE CBC W/AUTO DIFF WBC: CPT

## 2024-03-02 ASSESSMENT — PAIN DESCRIPTION - FREQUENCY: FREQUENCY: CONTINUOUS

## 2024-03-02 ASSESSMENT — PAIN DESCRIPTION - LOCATION: LOCATION: HEAD

## 2024-03-02 ASSESSMENT — PAIN DESCRIPTION - PAIN TYPE: TYPE: ACUTE PAIN

## 2024-03-02 ASSESSMENT — PAIN - FUNCTIONAL ASSESSMENT: PAIN_FUNCTIONAL_ASSESSMENT: 0-10

## 2024-03-02 ASSESSMENT — PAIN DESCRIPTION - ONSET: ONSET: ON-GOING

## 2024-03-02 ASSESSMENT — PAIN DESCRIPTION - DESCRIPTORS: DESCRIPTORS: ACHING;DISCOMFORT

## 2024-03-02 ASSESSMENT — PAIN SCALES - GENERAL: PAINLEVEL_OUTOF10: 5

## 2024-03-02 ASSESSMENT — PAIN DESCRIPTION - ORIENTATION: ORIENTATION: POSTERIOR

## 2024-03-02 NOTE — ED PROVIDER NOTES
[facility]  EMERGENCY DEPARTMENT ENCOUNTER          ATTENDING PHYSICIAN NOTE       Date of evaluation: 3/2/2024    Chief Complaint     Headache (Started yesterday around 0600 when awoke from sleep, pain on going- went to [facility] after no relief with ibuprofen at 1500. Patient states she was in living room talking to family then awoke on couch around 0030, family states LOC. Patient states occurred again shortly after that. Headache in back of head per patient and saw spots earllier ) and Loss of Consciousness      History of Present Illness     Paras Javier is a 28 y.o. female with pertinent PMHx including *** who presents ***    Nursing notes, PSHx, Social history, current medications and allergies were reviewed as documented in EHR and triage.     Physical Exam     INITIAL VITALS: BP: 126/73, Temp: 98.5 °F (36.9 °C), Pulse: 58, Respirations: 18, SpO2: 100 %     General:  WDWN obese*** female in NAD, nontoxic appearing ***  HEENT:  Normocephalic, atraumatic, ***MMM. No facial asymmetry. ***  Eyes: Anicteric, EOMI, PERRL ***  Neck:  Supple, full ROM, no bony TTP, no paraspinal TTP, no LAD ***  Pulmonary:   CTA bl, no wheezes, rales, rhonchi, no increased WOB or tachypnea, no chest wall TTP or visible deformities ***  Cardiac:  ***regular rate, ***regular rhythm, no m/r/g, no JVD, no peripheral edema***  Abdomen:  Soft, nondistended, non***tender to palpation in epigastrium, ***RUQ, ***LUQ, ***RLQ, ***LLQ, ***suprapubic region with no guarding or rebound. No CVA tenderness bilaterally  Extremities:  Warm, 2+ radial pulses b/l, 2+ DP pulses b/l. No extremity deformity, edema or tenderness appreciated  Skin: No rashes or bruises, no appreciable pallor***  Neuro:   Awake, alert, moving UE and LE spontaneously, CN 2-12 grossly intact, pt observed ambulating without difficulty ***  Psych:   Mood and affect appropriate for situation, denies SI/HI/AVH ***    Diagnostic Results and Other Data

## 2024-03-02 NOTE — DISCHARGE INSTRUCTIONS
You were seen in the emergency department for syncope. \"Syncope\" is the medical term for fainting. After fainting, a person quickly \"comes to\" and is OK again. Syncope is very common. About 1 out of every 3 people has it at some point in life. In many cases, syncope is nothing to worry about.     Syncope can happen when the brain temporarily doesn't get enough blood. One of the most common reasons this happens is called \"reflex\" or \"vasovagal\" syncope. With this type, your body has a reaction in which your heart beats too slowly or your blood vessels expand (or both). Another very common cause of syncope is called orthostatic syncope, caused by orthostatic hypotension. Orthostatic hypotension is a drop in blood pressure that can happen to some people when they stand up. This drop in blood pressure can make you feel dizzy or lightheaded. It can even make you pass out. Another term for orthostatic hypotension is \"postural hypotension.\"    You can do to help your symptoms and possibly prevent syncope:    ? Try to avoid the activities or conditions that cause your syncope. If a medicine causes your syncope, your doctor can recommend a different medicine.  ? Avoid standing for a long time. When you change positions, like go from lying down to sitting, or sitting to standing, do it slowly.  ? Ask your doctor how much fluid you should drink and how much salt you should have each day.  ? Sit or lie down with your feet up if you feel like you might faint. You can also try doing \"counterpressure maneuvers\" if you think that you might faint. For example:   Cross your legs while tensing your leg, abdominal, and buttock muscles.   Squeeze a rubber ball as hard as you can in your hand.   Clasp your hands in front of you while pulling your arms out to the sides.  ? Try to eat regular meals throughout the day.  ? Avoid driving when you feel faint. If you feel faint while driving, pull over right away.    Call your doctor or nurse  for advice if:    ? You faint again.  ? You faint or feel like you will faint, and also have any of the following symptoms:   Chest pain or discomfort, or severe trouble breathing   Feeling like your heart is beating very fast, very slow, or abnormally  ? You have a seizure.  ? You have weakness in your arms or legs.  ? You have trouble speaking, swallowing, seeing, or hearing.  ? You hit your head when you faint.

## 2024-03-03 LAB
BACTERIA UR CULT: ABNORMAL
BACTERIA UR CULT: ABNORMAL
EKG ATRIAL RATE: 55 BPM
EKG DIAGNOSIS: NORMAL
EKG P AXIS: 31 DEGREES
EKG P-R INTERVAL: 174 MS
EKG Q-T INTERVAL: 420 MS
EKG QRS DURATION: 86 MS
EKG QTC CALCULATION (BAZETT): 401 MS
EKG R AXIS: 43 DEGREES
EKG T AXIS: 14 DEGREES
EKG VENTRICULAR RATE: 55 BPM
ORGANISM: ABNORMAL

## 2024-03-04 RX ORDER — CEPHALEXIN 500 MG/1
500 CAPSULE ORAL 4 TIMES DAILY
Qty: 20 CAPSULE | Refills: 0 | Status: SHIPPED | OUTPATIENT
Start: 2024-03-04 | End: 2024-03-09

## 2024-03-29 ENCOUNTER — HOSPITAL ENCOUNTER (EMERGENCY)
Age: 29
Discharge: HOME | End: 2024-03-29
Attending: EMERGENCY MEDICINE
Payer: MEDICAID

## 2024-03-29 ENCOUNTER — APPOINTMENT (OUTPATIENT)
Dept: GENERAL RADIOLOGY | Age: 29
End: 2024-03-29
Payer: MEDICAID

## 2024-03-29 VITALS
HEART RATE: 64 BPM | SYSTOLIC BLOOD PRESSURE: 127 MMHG | WEIGHT: 293 LBS | DIASTOLIC BLOOD PRESSURE: 73 MMHG | RESPIRATION RATE: 18 BRPM | OXYGEN SATURATION: 100 % | TEMPERATURE: 98.2 F | BODY MASS INDEX: 47.09 KG/M2 | HEIGHT: 66 IN

## 2024-03-29 DIAGNOSIS — R42 DIZZINESS: ICD-10-CM

## 2024-03-29 DIAGNOSIS — R06.02 SHORTNESS OF BREATH: Primary | ICD-10-CM

## 2024-03-29 LAB
ANION GAP SERPL CALCULATED.3IONS-SCNC: 9 MMOL/L (ref 3–16)
BASOPHILS # BLD: 0.1 K/UL (ref 0–0.2)
BASOPHILS NFR BLD: 0.9 %
BUN SERPL-MCNC: 13 MG/DL (ref 7–20)
CALCIUM SERPL-MCNC: 9 MG/DL (ref 8.3–10.6)
CHLORIDE SERPL-SCNC: 100 MMOL/L (ref 99–110)
CO2 SERPL-SCNC: 27 MMOL/L (ref 21–32)
CREAT SERPL-MCNC: 0.8 MG/DL (ref 0.6–1.1)
DEPRECATED RDW RBC AUTO: 14.5 % (ref 12.4–15.4)
EKG ATRIAL RATE: 68 BPM
EKG DIAGNOSIS: NORMAL
EKG P AXIS: 43 DEGREES
EKG P-R INTERVAL: 186 MS
EKG Q-T INTERVAL: 404 MS
EKG QRS DURATION: 82 MS
EKG QTC CALCULATION (BAZETT): 429 MS
EKG R AXIS: 49 DEGREES
EKG T AXIS: 14 DEGREES
EKG VENTRICULAR RATE: 68 BPM
EOSINOPHIL # BLD: 0.1 K/UL (ref 0–0.6)
EOSINOPHIL NFR BLD: 1 %
GFR SERPLBLD CREATININE-BSD FMLA CKD-EPI: >90 ML/MIN/{1.73_M2}
GLUCOSE SERPL-MCNC: 118 MG/DL (ref 70–99)
HCG SERPL QL: NEGATIVE
HCT VFR BLD AUTO: 39.9 % (ref 36–48)
HGB BLD-MCNC: 12.9 G/DL (ref 12–16)
LYMPHOCYTES # BLD: 3 K/UL (ref 1–5.1)
LYMPHOCYTES NFR BLD: 35.5 %
MCH RBC QN AUTO: 26.5 PG (ref 26–34)
MCHC RBC AUTO-ENTMCNC: 32.3 G/DL (ref 31–36)
MCV RBC AUTO: 82.2 FL (ref 80–100)
MONOCYTES # BLD: 0.9 K/UL (ref 0–1.3)
MONOCYTES NFR BLD: 10.7 %
NEUTROPHILS # BLD: 4.4 K/UL (ref 1.7–7.7)
NEUTROPHILS NFR BLD: 51.9 %
PLATELET # BLD AUTO: 367 K/UL (ref 135–450)
PMV BLD AUTO: 8.6 FL (ref 5–10.5)
POTASSIUM SERPL-SCNC: 4.4 MMOL/L (ref 3.5–5.1)
RBC # BLD AUTO: 4.85 M/UL (ref 4–5.2)
SODIUM SERPL-SCNC: 136 MMOL/L (ref 136–145)
WBC # BLD AUTO: 8.5 K/UL (ref 4–11)

## 2024-03-29 PROCEDURE — 80048 BASIC METABOLIC PNL TOTAL CA: CPT

## 2024-03-29 PROCEDURE — 84703 CHORIONIC GONADOTROPIN ASSAY: CPT

## 2024-03-29 PROCEDURE — 99285 EMERGENCY DEPT VISIT HI MDM: CPT

## 2024-03-29 PROCEDURE — 85025 COMPLETE CBC W/AUTO DIFF WBC: CPT

## 2024-03-29 PROCEDURE — 71045 X-RAY EXAM CHEST 1 VIEW: CPT

## 2024-03-29 PROCEDURE — 93005 ELECTROCARDIOGRAM TRACING: CPT | Performed by: EMERGENCY MEDICINE

## 2024-03-29 ASSESSMENT — ENCOUNTER SYMPTOMS
SHORTNESS OF BREATH: 1
GASTROINTESTINAL NEGATIVE: 1
COUGH: 0
EYES NEGATIVE: 1

## 2024-03-29 ASSESSMENT — PAIN - FUNCTIONAL ASSESSMENT
PAIN_FUNCTIONAL_ASSESSMENT: NONE - DENIES PAIN
PAIN_FUNCTIONAL_ASSESSMENT: NONE - DENIES PAIN

## 2024-03-29 NOTE — ED PROVIDER NOTES
THE [facility]  EMERGENCY DEPARTMENT ENCOUNTER          ATTENDING PHYSICIAN NOTE       Date of evaluation: 3/29/2024    Chief Complaint     Shortness of Breath and Fatigue      History of Present Illness     Paras Javier is a 28 y.o. female who presents to the emergency department complaining of shortness of breath and dizziness.  Patient states that approximate hour prior to presentation she woke up from sleep feeling short of breath and then started to feel dizzy.  She states the dizziness as lightheadedness as well as sensation that the room is spinning around.  Patient has history of SVT status post ablation and frequently presents to the emergency department complaining of shortness of breath.  Her presentations are almost exclusively at night when she is going to bed.  Patient states she was feeling her normal state of health during the day yesterday with no recent fevers or cough.  She denies any swelling or pain in her extremities.  She does note a headache but states that headache is chronic for her and occurs every day.    ASSESSMENT / PLAN  (MEDICAL DECISION MAKING)     INITIAL VITALS: BP: 118/83, Temp: 98.2 °F (36.8 °C), Pulse: 68, Respirations: 18, SpO2: 99 %      Paras Javier is a 28 y.o. female who presents complaining of shortness of breath and dizziness.  Patient presents frequently to the emerged from it with similar complaints.  She states that started tonight approximately an hour prior to presentation when she was lying down to sleep.  On arrival, patient is hemodynamically stable with normal oxygen saturations and heart rate.  She has clear breath sounds and normal heart sounds.  EKG shows normal sinus rhythm and is unchanged from prior.  Chest x-ray shows no acute airspace disease.  CBC and renal panel are unremarkable.  hCG is negative.  Patient was observed in the emergency room and had no evidence of arrhythmia.  I feel most likely patient's symptoms are secondary to

## 2024-03-29 NOTE — DISCHARGE INSTRUCTIONS
Your testing in the emergency department was reassuring with no abnormalities to your blood counts, electrolytes, or kidney function.  Your chest x-ray shows no evidence of pneumonia.  Your EKG is unchanged from baseline.  Please follow-up with your primary care provider for repeat evaluation and further testing if symptoms continue.   Principal Discharge DX:	Abdominal pain

## 2024-09-25 NOTE — ED PROVIDER NOTES
[facility] ENCOUNTER          NURSE PRACTITIONER NOTE       Date of evaluation: 3/21/2023    Chief Complaint     Shortness of Breath (Woke up with shortness of breath, and generalized body aches. )    History of Present Illness     D'Jareca Anastasia Sandhoff is a 32 y.o. female with past medical history notable for HTN, TIA, SVT, who presents with shortness of breath and generalized body aches. Patient states all her symptoms began on Wednesday, 3/15/2023. However, symptoms seem to be persisting. States she was seen in the ED last Wednesday and diagnosed with SVT. She was started on metoprolol and is actively following up with cardiology with plan for scheduled ablation. Spoke with her cardiologist via phone today who increased her metoprolol to 50mg BID. Took first dose of 50MG metoprolol just prior to arrival to the ED. She presents to the ED due to feeling lightheaded, generally weak, feeling like she may pass out ever since last Wednesday. Also reports intermittent chest pain, heart palpitations, shortness of breath. States she has had somewhat of a persistent headache which has not responded well to oral Tylenol. Over the last couple of days, she has developed diffuse body aches and states \"everything hurts\". Has difficulty describing her pain if wrenching the type of pain. States she has never felt like this before. Since arrival to ED, patient has not had chest palpitations, chest pain, or shortness of breath. Denies upper respiratory symptoms including nasal congestion, sore throat, cough. No recent illness. No fevers, chills, infectious symptoms. Denies concern for pregnancy today. Review of Systems     Review of systems negative with the exception of those noted in the HPI.     Past Medical, Surgical, Family, and Social History     She has a past medical history of Class 3 severe obesity due to excess calories without serious comorbidity with body mass index
history. She has never used smokeless tobacco. She reports that she does not drink alcohol and does not use drugs. Pertinent Physical Exam Findings:   Normal neurologic examination. The patient is awake, alert, pleasantly conversational.  Normal cardiovascular examination, with normal rate and rhythm on the monitor, and by auscultation. Clear lungs. EKG shows normal sinus rhythm.           Moses Van MD  03/23/23 4726
37.6

## 2024-10-09 ENCOUNTER — HOSPITAL ENCOUNTER (EMERGENCY)
Age: 29
Discharge: HOME | End: 2024-10-09
Attending: EMERGENCY MEDICINE
Payer: MEDICAID

## 2024-10-09 VITALS
SYSTOLIC BLOOD PRESSURE: 170 MMHG | DIASTOLIC BLOOD PRESSURE: 98 MMHG | OXYGEN SATURATION: 100 % | HEIGHT: 66 IN | BODY MASS INDEX: 47.09 KG/M2 | WEIGHT: 293 LBS | TEMPERATURE: 98.9 F | HEART RATE: 96 BPM | RESPIRATION RATE: 14 BRPM

## 2024-10-09 DIAGNOSIS — J02.0 STREP PHARYNGITIS: Primary | ICD-10-CM

## 2024-10-09 LAB
S PYO AG THROAT QL: POSITIVE
SARS-COV-2 RDRP RESP QL NAA+PROBE: NOT DETECTED

## 2024-10-09 PROCEDURE — 87880 STREP A ASSAY W/OPTIC: CPT

## 2024-10-09 PROCEDURE — 99283 EMERGENCY DEPT VISIT LOW MDM: CPT

## 2024-10-09 PROCEDURE — 6370000000 HC RX 637 (ALT 250 FOR IP): Performed by: EMERGENCY MEDICINE

## 2024-10-09 PROCEDURE — 87635 SARS-COV-2 COVID-19 AMP PRB: CPT

## 2024-10-09 RX ORDER — CLINDAMYCIN HCL 300 MG
300 CAPSULE ORAL ONCE
Status: COMPLETED | OUTPATIENT
Start: 2024-10-09 | End: 2024-10-09

## 2024-10-09 RX ORDER — CLINDAMYCIN HCL 300 MG
300 CAPSULE ORAL 3 TIMES DAILY
Qty: 30 CAPSULE | Refills: 0 | Status: SHIPPED | OUTPATIENT
Start: 2024-10-09 | End: 2024-10-19

## 2024-10-09 RX ORDER — NAPROXEN 500 MG/1
500 TABLET ORAL 2 TIMES DAILY
Qty: 20 TABLET | Refills: 0 | Status: SHIPPED | OUTPATIENT
Start: 2024-10-09 | End: 2024-10-19

## 2024-10-09 RX ADMIN — CLINDAMYCIN HYDROCHLORIDE 300 MG: 300 CAPSULE ORAL at 21:21

## 2024-10-09 ASSESSMENT — PAIN - FUNCTIONAL ASSESSMENT: PAIN_FUNCTIONAL_ASSESSMENT: 0-10

## 2024-10-09 ASSESSMENT — PAIN DESCRIPTION - LOCATION: LOCATION: EAR;THROAT

## 2024-10-09 ASSESSMENT — PAIN SCALES - GENERAL: PAINLEVEL_OUTOF10: 7

## 2024-10-10 NOTE — ED PROVIDER NOTES
[facility] EMERGENCY DEPARTMENT  eMERGENCY dEPARTMENT eNCOUnter      Pt Name: Paras Javier  MRN: [id number]  Birthdate 1995  Date of evaluation: 10/9/2024  Provider: Lauri Guillen MD  PCP: [facility]      CHIEF COMPLAINT       Chief Complaint   Patient presents with    Pharyngitis       HISTORY OFPRESENT ILLNESS   (Location/Symptom, Timing/Onset, Context/Setting, Quality, Duration, Modifying Factors,Severity)  Note limiting factors.     Paras Javier is a 29 y.o. female several days of sore throat some difficulty swallowing no difficulty breathing think she has strep throat    Nursing Notes were all reviewed and agreed with or any disagreements were addressed  in the HPI.    REVIEW OF SYSTEMS    (2-9 systems for level 4, 10 or more for level 5)     Review of Systems    Positives and Pertinent negatives as per HPI.  Except as noted above in the ROS, all other systems were reviewed andnegative.       PASTMEDICAL HISTORY     Past Medical History:   Diagnosis Date    Class 3 severe obesity due to excess calories without serious comorbidity with body mass index (BMI) of 50.0 to 59.9 in adult 02/15/2023    Hypertension     Prediabetes     SVT (supraventricular tachycardia) (HCC) 09/02/2022    TIA (transient ischemic attack)          SURGICAL HISTORY     History reviewed. No pertinent surgical history.      CURRENT MEDICATIONS       Previous Medications    METOPROLOL TARTRATE (LOPRESSOR) 50 MG TABLET    Take 1 tablet by mouth 2 times daily    OMEPRAZOLE (PRILOSEC) 20 MG DELAYED RELEASE CAPSULE    Take 1 capsule by mouth every morning (before breakfast)       ALLERGIES     Dilaudid [hydromorphone hcl]    FAMILY HISTORY       Family History   Problem Relation Age of Onset    Sleep Apnea Brother           SOCIAL HISTORY       Social History     Socioeconomic History    Marital status: Single     Spouse name: None    Number of children: None    Years of education: None    Highest

## 2024-12-19 ENCOUNTER — HOSPITAL ENCOUNTER (EMERGENCY)
Age: 29
Discharge: HOME | End: 2024-12-19
Attending: EMERGENCY MEDICINE

## 2024-12-19 VITALS
SYSTOLIC BLOOD PRESSURE: 137 MMHG | HEART RATE: 78 BPM | WEIGHT: 293 LBS | HEIGHT: 66 IN | TEMPERATURE: 98.1 F | OXYGEN SATURATION: 99 % | RESPIRATION RATE: 15 BRPM | BODY MASS INDEX: 47.09 KG/M2 | DIASTOLIC BLOOD PRESSURE: 59 MMHG

## 2024-12-19 DIAGNOSIS — R00.2 PALPITATIONS: Primary | ICD-10-CM

## 2024-12-19 LAB
ANION GAP SERPL CALCULATED.3IONS-SCNC: 12 MMOL/L (ref 3–16)
BASOPHILS # BLD: 0.1 K/UL (ref 0–0.2)
BASOPHILS NFR BLD: 1 %
BUN SERPL-MCNC: 12 MG/DL (ref 7–20)
CALCIUM SERPL-MCNC: 9.2 MG/DL (ref 8.3–10.6)
CHLORIDE SERPL-SCNC: 105 MMOL/L (ref 99–110)
CO2 SERPL-SCNC: 24 MMOL/L (ref 21–32)
CREAT SERPL-MCNC: 0.9 MG/DL (ref 0.6–1.1)
DEPRECATED RDW RBC AUTO: 14.7 % (ref 12.4–15.4)
EKG ATRIAL RATE: 84 BPM
EKG DIAGNOSIS: NORMAL
EKG P AXIS: 50 DEGREES
EKG P-R INTERVAL: 192 MS
EKG Q-T INTERVAL: 340 MS
EKG QRS DURATION: 78 MS
EKG QTC CALCULATION (BAZETT): 401 MS
EKG R AXIS: 47 DEGREES
EKG T AXIS: -14 DEGREES
EKG VENTRICULAR RATE: 84 BPM
EOSINOPHIL # BLD: 0.1 K/UL (ref 0–0.6)
EOSINOPHIL NFR BLD: 1.3 %
GFR SERPLBLD CREATININE-BSD FMLA CKD-EPI: 88 ML/MIN/{1.73_M2}
GLUCOSE SERPL-MCNC: 130 MG/DL (ref 70–99)
HCG SERPL QL: NEGATIVE
HCT VFR BLD AUTO: 38.7 % (ref 36–48)
HGB BLD-MCNC: 12.5 G/DL (ref 12–16)
LYMPHOCYTES # BLD: 2.9 K/UL (ref 1–5.1)
LYMPHOCYTES NFR BLD: 38.6 %
MCH RBC QN AUTO: 26.3 PG (ref 26–34)
MCHC RBC AUTO-ENTMCNC: 32.3 G/DL (ref 31–36)
MCV RBC AUTO: 81.5 FL (ref 80–100)
MONOCYTES # BLD: 1 K/UL (ref 0–1.3)
MONOCYTES NFR BLD: 13.7 %
NEUTROPHILS # BLD: 3.4 K/UL (ref 1.7–7.7)
NEUTROPHILS NFR BLD: 45.4 %
PLATELET # BLD AUTO: 331 K/UL (ref 135–450)
PMV BLD AUTO: 8.2 FL (ref 5–10.5)
POTASSIUM SERPL-SCNC: 3.7 MMOL/L (ref 3.5–5.1)
RBC # BLD AUTO: 4.75 M/UL (ref 4–5.2)
SODIUM SERPL-SCNC: 141 MMOL/L (ref 136–145)
TROPONIN, HIGH SENSITIVITY: <6 NG/L (ref 0–14)
WBC # BLD AUTO: 7.5 K/UL (ref 4–11)

## 2024-12-19 PROCEDURE — 84703 CHORIONIC GONADOTROPIN ASSAY: CPT

## 2024-12-19 PROCEDURE — 6360000002 HC RX W HCPCS

## 2024-12-19 PROCEDURE — 96374 THER/PROPH/DIAG INJ IV PUSH: CPT

## 2024-12-19 PROCEDURE — 84484 ASSAY OF TROPONIN QUANT: CPT

## 2024-12-19 PROCEDURE — 99284 EMERGENCY DEPT VISIT MOD MDM: CPT

## 2024-12-19 PROCEDURE — 93005 ELECTROCARDIOGRAM TRACING: CPT

## 2024-12-19 PROCEDURE — 80048 BASIC METABOLIC PNL TOTAL CA: CPT

## 2024-12-19 PROCEDURE — 85025 COMPLETE CBC W/AUTO DIFF WBC: CPT

## 2024-12-19 RX ORDER — ONDANSETRON 2 MG/ML
8 INJECTION INTRAMUSCULAR; INTRAVENOUS ONCE
Status: DISCONTINUED | OUTPATIENT
Start: 2024-12-19 | End: 2024-12-19

## 2024-12-19 RX ORDER — KETOROLAC TROMETHAMINE 30 MG/ML
15 INJECTION, SOLUTION INTRAMUSCULAR; INTRAVENOUS ONCE
Status: COMPLETED | OUTPATIENT
Start: 2024-12-19 | End: 2024-12-19

## 2024-12-19 RX ADMIN — KETOROLAC TROMETHAMINE 15 MG: 30 INJECTION INTRAMUSCULAR; INTRAVENOUS at 01:56

## 2024-12-19 ASSESSMENT — PAIN SCALES - GENERAL: PAINLEVEL_OUTOF10: 7

## 2024-12-19 ASSESSMENT — PAIN DESCRIPTION - DESCRIPTORS: DESCRIPTORS: ACHING

## 2024-12-19 ASSESSMENT — PAIN DESCRIPTION - LOCATION: LOCATION: HEAD

## 2024-12-19 NOTE — DISCHARGE INSTRUCTIONS
You were seen in the emergency department after having an episode of feeling like your heart was racing.  Your EKG and labs here are reassuring against any current SVT or signs of damage to your heart.  Please follow-up with your doctor.  Return to the emergency department if you have any other concerns

## 2024-12-19 NOTE — ED PROVIDER NOTES
THE [facility]  EMERGENCY DEPARTMENT ENCOUNTER          EM RESIDENT NOTE       Date of evaluation: 12/19/2024    Chief Complaint     SVT (Pt woke up thinking she was in SVT, upon ems arrival shew as in SR, pt feeling weak, )      History of Present Illness     FRANCO Triana is a 29 y.o. female with a past medical history of SVT status post ablation who presents to the emergency department palpitations that lasted approximately 2 minutes prior to arrival and lightheadedness.  States that now her symptoms have largely improved but just feels generally weak, which is common after having these episodes.  After her ablation, she has not had any confirmed recurrent SVT.  She has had multiple ER visits for similar symptoms but was found to be in sinus tachycardia each time.  Unable to wear a Holter monitor secondary to body habitus.  Otherwise states that she has been feeling well without any recent fevers or illnesses.  Denies any dyspnea or exertional symptoms.    MEDICAL DECISION MAKING / ASSESSMENT / PLAN     INITIAL VITALS: BP: (!) 137/59, Temp: 98.1 °F (36.7 °C), Pulse: 78, Respirations: 15, SpO2: 99 %    FRANCO Triana is a 29 y.o. female who presents to the emergency department after having an episode of palpitations and lightheadedness.  Her vital signs are normal here.  EKG shows NSR.  However, she does have new T wave inversions.  She does not describe any chest pain or exertional symptoms that would be concerning for ACS, but will check troponin as well as basic labs/electrolytes.  Will keep her on the cardiac monitor here to assess for any recurrent symptoms/episodes while waiting on labs.  She also reported headache, which is treated with IV Toradol.    EKG does not show any ST elevation or depression.  She does have some T wave inversions of V3 through V6 but does not have any chest pain and did not have chest pain at home.  Her troponin is negative which is reassuring.  She has no

## 2024-12-19 NOTE — ED PROVIDER NOTES
ED Attending Attestation Note     Date of evaluation: 12/19/2024    This patient was seen by the resident.  I have seen and examined the patient, agree with the workup, evaluation, management and diagnosis. The care plan has been discussed.  I have reviewed the ECG and concur with the resident's interpretation.  My assessment reveals complaints of lightheadedness and palpitations.  Patient has a history of SVT status post ablation and states that this evening she started to feel like her heart was racing.  On arrival, patient is noted to have a normal heart rate and normal sinus rhythm on the monitor.  Will check laboratory studies, observe in the emergency department.     Azeem Whitehead MD  12/19/24 4331

## 2025-01-12 ENCOUNTER — HOSPITAL ENCOUNTER (EMERGENCY)
Age: 30
Discharge: HOME | End: 2025-01-12
Attending: EMERGENCY MEDICINE
Payer: MEDICAID

## 2025-01-12 VITALS
DIASTOLIC BLOOD PRESSURE: 79 MMHG | HEIGHT: 66 IN | WEIGHT: 293 LBS | RESPIRATION RATE: 14 BRPM | TEMPERATURE: 98.3 F | OXYGEN SATURATION: 94 % | SYSTOLIC BLOOD PRESSURE: 136 MMHG | BODY MASS INDEX: 47.09 KG/M2 | HEART RATE: 79 BPM

## 2025-01-12 DIAGNOSIS — R51.9 NONINTRACTABLE HEADACHE, UNSPECIFIED CHRONICITY PATTERN, UNSPECIFIED HEADACHE TYPE: Primary | ICD-10-CM

## 2025-01-12 LAB
ANION GAP SERPL CALCULATED.3IONS-SCNC: 11 MMOL/L (ref 3–16)
BASOPHILS # BLD: 0.1 K/UL (ref 0–0.2)
BASOPHILS NFR BLD: 0.8 %
BUN SERPL-MCNC: 15 MG/DL (ref 7–20)
CALCIUM SERPL-MCNC: 9.3 MG/DL (ref 8.3–10.6)
CHLORIDE SERPL-SCNC: 103 MMOL/L (ref 99–110)
CO2 SERPL-SCNC: 25 MMOL/L (ref 21–32)
CREAT SERPL-MCNC: 0.8 MG/DL (ref 0.6–1.1)
DEPRECATED RDW RBC AUTO: 15.4 % (ref 12.4–15.4)
EOSINOPHIL # BLD: 0.1 K/UL (ref 0–0.6)
EOSINOPHIL NFR BLD: 1.6 %
GFR SERPLBLD CREATININE-BSD FMLA CKD-EPI: >90 ML/MIN/{1.73_M2}
GLUCOSE SERPL-MCNC: 128 MG/DL (ref 70–99)
HCG SERPL QL: NEGATIVE
HCT VFR BLD AUTO: 40.4 % (ref 36–48)
HGB BLD-MCNC: 13 G/DL (ref 12–16)
LYMPHOCYTES # BLD: 2.3 K/UL (ref 1–5.1)
LYMPHOCYTES NFR BLD: 28.7 %
MCH RBC QN AUTO: 26.1 PG (ref 26–34)
MCHC RBC AUTO-ENTMCNC: 32.2 G/DL (ref 31–36)
MCV RBC AUTO: 81 FL (ref 80–100)
MONOCYTES # BLD: 0.9 K/UL (ref 0–1.3)
MONOCYTES NFR BLD: 11.1 %
NEUTROPHILS # BLD: 4.6 K/UL (ref 1.7–7.7)
NEUTROPHILS NFR BLD: 57.8 %
PLATELET # BLD AUTO: 317 K/UL (ref 135–450)
PMV BLD AUTO: 8.1 FL (ref 5–10.5)
POTASSIUM SERPL-SCNC: 4 MMOL/L (ref 3.5–5.1)
RBC # BLD AUTO: 4.99 M/UL (ref 4–5.2)
SODIUM SERPL-SCNC: 139 MMOL/L (ref 136–145)
WBC # BLD AUTO: 8 K/UL (ref 4–11)

## 2025-01-12 PROCEDURE — 99284 EMERGENCY DEPT VISIT MOD MDM: CPT

## 2025-01-12 PROCEDURE — 2580000003 HC RX 258: Performed by: EMERGENCY MEDICINE

## 2025-01-12 PROCEDURE — 85025 COMPLETE CBC W/AUTO DIFF WBC: CPT

## 2025-01-12 PROCEDURE — 84703 CHORIONIC GONADOTROPIN ASSAY: CPT

## 2025-01-12 PROCEDURE — 6360000002 HC RX W HCPCS: Performed by: EMERGENCY MEDICINE

## 2025-01-12 PROCEDURE — 96374 THER/PROPH/DIAG INJ IV PUSH: CPT

## 2025-01-12 PROCEDURE — 80048 BASIC METABOLIC PNL TOTAL CA: CPT

## 2025-01-12 PROCEDURE — 96375 TX/PRO/DX INJ NEW DRUG ADDON: CPT

## 2025-01-12 RX ORDER — GABAPENTIN 300 MG/1
300 CAPSULE ORAL DAILY PRN
COMMUNITY
Start: 2024-12-20

## 2025-01-12 RX ORDER — IBUPROFEN 800 MG/1
800 TABLET, FILM COATED ORAL EVERY 8 HOURS PRN
COMMUNITY
Start: 2025-01-09

## 2025-01-12 RX ORDER — DIPHENHYDRAMINE HYDROCHLORIDE 50 MG/ML
25 INJECTION INTRAMUSCULAR; INTRAVENOUS ONCE
Status: DISCONTINUED | OUTPATIENT
Start: 2025-01-12 | End: 2025-01-12 | Stop reason: HOSPADM

## 2025-01-12 RX ORDER — 0.9 % SODIUM CHLORIDE 0.9 %
1000 INTRAVENOUS SOLUTION INTRAVENOUS ONCE
Status: COMPLETED | OUTPATIENT
Start: 2025-01-12 | End: 2025-01-12

## 2025-01-12 RX ORDER — PROCHLORPERAZINE EDISYLATE 5 MG/ML
10 INJECTION INTRAMUSCULAR; INTRAVENOUS ONCE
Status: COMPLETED | OUTPATIENT
Start: 2025-01-12 | End: 2025-01-12

## 2025-01-12 RX ORDER — KETOROLAC TROMETHAMINE 30 MG/ML
15 INJECTION, SOLUTION INTRAMUSCULAR; INTRAVENOUS ONCE
Status: COMPLETED | OUTPATIENT
Start: 2025-01-12 | End: 2025-01-12

## 2025-01-12 RX ADMIN — KETOROLAC TROMETHAMINE 15 MG: 30 INJECTION INTRAMUSCULAR; INTRAVENOUS at 02:06

## 2025-01-12 RX ADMIN — SODIUM CHLORIDE 1000 ML: 9 INJECTION, SOLUTION INTRAVENOUS at 02:05

## 2025-01-12 RX ADMIN — PROCHLORPERAZINE EDISYLATE 10 MG: 5 INJECTION INTRAMUSCULAR; INTRAVENOUS at 02:06

## 2025-01-12 ASSESSMENT — PAIN SCALES - GENERAL: PAINLEVEL_OUTOF10: 8

## 2025-01-12 ASSESSMENT — PAIN DESCRIPTION - DESCRIPTORS: DESCRIPTORS: ACHING

## 2025-01-12 ASSESSMENT — PAIN DESCRIPTION - LOCATION: LOCATION: HEAD

## 2025-01-12 NOTE — ED PROVIDER NOTES
THE [facility]  EMERGENCY DEPARTMENT ENCOUNTER          ATTENDING PHYSICIAN NOTE       Date of evaluation: 1/12/2025    Chief Complaint     Headache (Pt states she has had a headache x 2 hours. States she has chronic migraines but has not had follow up care. States she did not take anything today when this headache started. )      History of Present Illness     Paras Javier is a 29 y.o. female who presents to the emergency department today complaining of headaches.  Reports a history of some  headaches in the past but states that this headache has been nagging her for the last 10 days.  Patient reports a history of similar headaches.  States they tend to bother her more at night when she is sleeping.  She reports it is a throbbing sensation.  Denies vomiting reports that she is supposed to wear CPAP but is not been doing so recently because the mask wants down her face very well.  Denies fevers chills recent URI symptoms.    Review of Systems     Review of Systems  All systems were reviewed with the patient/family and negative except as otherwise documented in the HPI.      Past Medical, Surgical, Family, and Social History     She has a past medical history of Class 3 severe obesity due to excess calories without serious comorbidity with body mass index (BMI) of 50.0 to 59.9 in adult, Hypertension, Prediabetes, SVT (supraventricular tachycardia) (HCC), and TIA (transient ischemic attack).  She has no past surgical history on file.  Her family history includes Sleep Apnea in her brother.  She reports that she quit smoking about 4 years ago. Her smoking use included cigarettes. She started smoking about 8 years ago. She has a 0.4 pack-year smoking history. She has never used smokeless tobacco. She reports that she does not drink alcohol and does not use drugs.    Medications     Discharge Medication List as of 1/12/2025  3:48 AM        CONTINUE these medications which have NOT CHANGED    Details

## 2025-01-24 NOTE — PROGRESS NOTES
[facility]     Outpatient Cardiology         Patient Name:  Paras Javier  Primary Care Physician: [facility]  01/27/25     Assessment & Plan    Assessment / Plan:     Preoperative cardiac risk evaluation-overall cardiac risk is acceptable.  Patient has had history of SVT status post ablation in 2023.  No recent episodes of SVT.  Has adequate exercise tolerance.  Echocardiogram in 2023 showed normal LV function.  Clinically patient does not have any significant evidence of hypervolemia.  From a cardiac standpoint patient can proceed with no further cardiac testing.  Probably cardiac risk of 2 to 3%.  Prior CT calcium score of 0.  Recommend continuation of the metoprolol in the perioperative phase    Obstructive sleep apnea-continue using CPAP.    May need dose reduction of metoprolol and follow-up once patient loses weight.  Follow-up in 6 months              Chief Complaint:     Chief Complaint   Patient presents with    Cardiac Clearance     Wt loss surgery / Former  pt        History of Present Illness:       HPI     Paras Javier is a 29 y.o. female with PMH of HTN, FRANSICO uses CPAP, TIA, SVT s/p SVT ablation in 2023 here for a pre-operative CV risk assessment for bariatric surgery.      Today she reports she is doing well. No further complaints of palpations.    Patient denies any chest pain, shortness of breath, palpitations, presyncope or syncope. No TIA. No claudication. No recent hospitalizations    PMH  Past Medical History:   Diagnosis Date    Class 3 severe obesity due to excess calories without serious comorbidity with body mass index (BMI) of 50.0 to 59.9 in adult 02/15/2023    Hypertension     Prediabetes     SVT (supraventricular tachycardia) (HCC) 09/02/2022    TIA (transient ischemic attack)        PSH  No past surgical history on file.     Social HIstory  Social History     Tobacco Use    Smoking status: Former     Current packs/day: 0.00

## 2025-01-24 NOTE — PATIENT INSTRUCTIONS
Thank you for choosing [facility] for your cardiac care.    During your visit today, we reviewed and confirmed your cardiac medications along with  medication prescribed by your other healthcare team members. Please be sure to discuss any  changes to medication with your providers.    Please bring a list of ALL medications (or the bottles) with you to EVERY appointment.  Also include vitamins and over-the-counter medications.    If you need refills for any cardiac medications, please call your pharmacy and they will reach out to us electronically.    Did your provider order testing today? If yes, then you will receive your results in three  possible ways. You can receive a [employer] message, a phone call, or letter in the mail. Please  note, if you are an active [employer] user, some of your testing will be available within 1-2 days.    Finally, please know that it is good for your heart to exercise and follow a healthy, low-fat diet  as advised by your physician and health care providers.    If you are experiencing a medical emergency, please call 911 immediately.    It's easy to register for a [employer] account if you don't already have one. With a [employer]  account you can manage your health record, view test results, schedule appointments and  more.     Dr. Callahan's clinical staff can be reached at the following phone number: [phone]    If any cardiac testing is ordered, please contact central scheduling at [phone] to get your test scheduled.

## 2025-01-27 ENCOUNTER — OFFICE VISIT (OUTPATIENT)
Dept: CARDIOLOGY CLINIC | Age: 30
End: 2025-01-27
Payer: MEDICAID

## 2025-01-27 VITALS
BODY MASS INDEX: 57.94 KG/M2 | SYSTOLIC BLOOD PRESSURE: 118 MMHG | HEART RATE: 96 BPM | DIASTOLIC BLOOD PRESSURE: 80 MMHG | OXYGEN SATURATION: 80 % | WEIGHT: 293 LBS

## 2025-01-27 DIAGNOSIS — G47.33 OSA (OBSTRUCTIVE SLEEP APNEA): ICD-10-CM

## 2025-01-27 DIAGNOSIS — I47.10 SVT (SUPRAVENTRICULAR TACHYCARDIA) (HCC): ICD-10-CM

## 2025-01-27 DIAGNOSIS — Z01.818 PRE-OP EVALUATION: Primary | ICD-10-CM

## 2025-01-27 PROCEDURE — 99214 OFFICE O/P EST MOD 30 MIN: CPT | Performed by: INTERNAL MEDICINE

## 2025-01-27 PROCEDURE — 93000 ELECTROCARDIOGRAM COMPLETE: CPT | Performed by: INTERNAL MEDICINE

## 2025-01-27 RX ORDER — BUTALBITAL, ACETAMINOPHEN AND CAFFEINE 50; 325; 40 MG/1; MG/1; MG/1
1 TABLET ORAL EVERY 6 HOURS PRN
COMMUNITY
Start: 2024-12-23

## 2025-01-27 RX ORDER — ESCITALOPRAM OXALATE 20 MG/1
TABLET ORAL
COMMUNITY
Start: 2025-01-08

## 2025-07-03 ENCOUNTER — HOSPITAL ENCOUNTER (EMERGENCY)
Age: 30
Discharge: HOME | End: 2025-07-03
Attending: EMERGENCY MEDICINE
Payer: MEDICAID

## 2025-07-03 VITALS
DIASTOLIC BLOOD PRESSURE: 67 MMHG | HEIGHT: 66 IN | OXYGEN SATURATION: 100 % | BODY MASS INDEX: 47.09 KG/M2 | TEMPERATURE: 98.6 F | WEIGHT: 293 LBS | SYSTOLIC BLOOD PRESSURE: 127 MMHG | RESPIRATION RATE: 16 BRPM | HEART RATE: 84 BPM

## 2025-07-03 DIAGNOSIS — S01.531A INFECTED PIERCED LIP: Primary | ICD-10-CM

## 2025-07-03 DIAGNOSIS — L08.9 INFECTED PIERCED LIP: Primary | ICD-10-CM

## 2025-07-03 PROCEDURE — 99283 EMERGENCY DEPT VISIT LOW MDM: CPT

## 2025-07-03 RX ORDER — DOXYCYCLINE HYCLATE 100 MG
100 TABLET ORAL 2 TIMES DAILY
Qty: 14 TABLET | Refills: 0 | Status: SHIPPED | OUTPATIENT
Start: 2025-07-03 | End: 2025-07-10

## 2025-07-03 RX ORDER — MUPIROCIN 2 %
OINTMENT (GRAM) TOPICAL
Qty: 15 G | Refills: 0 | Status: SHIPPED | OUTPATIENT
Start: 2025-07-03

## 2025-07-03 ASSESSMENT — PAIN SCALES - GENERAL: PAINLEVEL_OUTOF10: 6

## 2025-07-03 ASSESSMENT — PAIN - FUNCTIONAL ASSESSMENT
PAIN_FUNCTIONAL_ASSESSMENT: 0-10
PAIN_FUNCTIONAL_ASSESSMENT: NONE - DENIES PAIN

## 2025-07-03 ASSESSMENT — PAIN DESCRIPTION - PAIN TYPE: TYPE: ACUTE PAIN

## 2025-07-03 ASSESSMENT — PAIN DESCRIPTION - FREQUENCY: FREQUENCY: CONTINUOUS

## 2025-07-03 ASSESSMENT — PAIN DESCRIPTION - DESCRIPTORS: DESCRIPTORS: ACHING

## 2025-07-03 ASSESSMENT — PAIN DESCRIPTION - LOCATION: LOCATION: MOUTH

## 2025-07-03 NOTE — ED PROVIDER NOTES
CC:   Chief Complaint   Patient presents with    Oral Swelling     Upper lip swelling at piercing         HPI:    History of Present Illness  A 30-year-old female presents to the emergency department with complaints of upper lip pain and swelling associated with a lip piercing. The patient reports that her lip piercing, which she has had for a few years, began hurting a few days ago. Initially, the pain was mild and did not interfere with the piercing's placement. However, today she woke up with significant swelling and increased pain in her upper lip.    The patient describes the pain as localized to the area around the piercing. She denies any systemic symptoms such as fevers or chills. The patient expresses a desire to have the piercing removed due to the discomfort. She mentions that the piercing has a flat back with a screw-in stud design.    The patient's symptoms have progressed from mild discomfort to more severe pain and noticeable swelling over the course of a few days. The sudden onset of swelling this morning prompted her visit to the emergency department. The patient does not report any specific aggravating or alleviating factors, nor does she mention any impact on her daily functioning or recent stressors that may have contributed to the issue.  History obtained via the patient    External records reviewed    ROS:  All Pertinent ROS Negative Unless otherwise stated within HPI.    VITALS:  Vitals:    07/03/25 1230   BP: 127/67   Pulse: 84   Resp: 16   Temp: 98.6 °F (37 °C)   SpO2: 100%        PHYSICAL EXAM:  Vital signs reviewed    Physical Examination  HEENT: Mild swelling of the upper lip without induration or fluctuance. No erythema noted. Tenderness over the tissue surrounding the lip piercing. Stud piercing in place mid-lip.    Procedure note piercing removed    Performed piercing removal procedure: The following was performed    - Advanced piercing to expose stud    - Clamped stud with hemostat

## 2025-07-03 NOTE — ED NOTES
Patient to ed with complaints of pain and swelling with her piercing on the upper lip, she denies injury and is unable to remove piercing.

## 2025-07-03 NOTE — DISCHARGE INSTR - COC
Continuity of Care Form    Patient Name: Paras Javier   :  1995  MRN:  [id number]    Admit date:  7/3/2025  Discharge date:  ***    Code Status Order: Prior   Advance Directives:     Admitting Physician:  No admitting provider for patient encounter.  PCP: Lacy Dumont, X    Discharging Nurse: ***  Discharging Hospital Unit/Room#:   Discharging Unit Phone Number: ***    Emergency Contact:   Extended Emergency Contact Information  Primary Emergency Contact: [place] [address] [facility]  Home Phone: [phone]  Relation: Parent    Past Surgical History:  History reviewed. No pertinent surgical history.    Immunization History:     There is no immunization history on file for this patient.    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 coli               Resolved       Infection Onset Added Last Indicated Last Indicated By Resolved Resolved By    COVID-19 24 COVID-19 & Influenza Combo 24 Infection                          Nurse Assessment:  Last Vital Signs: /67   Pulse 84   Temp 98.6 °F (37 °C) (Oral)   Resp 16   Ht 1.676 m (5' 6\")   Wt (!) 140.2 kg (309 lb 1.4 oz)   SpO2 100%   BMI 49.89 kg/m²     Last documented pain score (0-10 scale): Pain Level: 6  Last Weight:   Wt Readings from Last 1 Encounters:   25 (!) 140.2 kg (309 lb 1.4 oz)     Mental

## 2025-07-03 NOTE — DISCHARGE INSTRUCTIONS
Please keep your piercing out.  Wash the skin twice a day with soap and water and apply antibiotic ointment.  Take oral antibiotics until gone.  Tylenol for pain control.  Return for worsening redness swelling or increased pain.